# Patient Record
Sex: FEMALE | Race: ASIAN | NOT HISPANIC OR LATINO | Employment: FULL TIME | URBAN - METROPOLITAN AREA
[De-identification: names, ages, dates, MRNs, and addresses within clinical notes are randomized per-mention and may not be internally consistent; named-entity substitution may affect disease eponyms.]

---

## 2022-05-09 ENCOUNTER — HOSPITAL ENCOUNTER (EMERGENCY)
Facility: HOSPITAL | Age: 57
Discharge: HOME | End: 2022-05-09
Attending: EMERGENCY MEDICINE | Admitting: EMERGENCY MEDICINE
Payer: COMMERCIAL

## 2022-05-09 ENCOUNTER — APPOINTMENT (OUTPATIENT)
Dept: CT IMAGING | Facility: HOSPITAL | Age: 57
End: 2022-05-09
Attending: EMERGENCY MEDICINE
Payer: COMMERCIAL

## 2022-05-09 VITALS
HEIGHT: 59 IN | OXYGEN SATURATION: 98 % | HEART RATE: 88 BPM | TEMPERATURE: 98.4 F | WEIGHT: 155 LBS | DIASTOLIC BLOOD PRESSURE: 101 MMHG | RESPIRATION RATE: 15 BRPM | BODY MASS INDEX: 31.25 KG/M2 | SYSTOLIC BLOOD PRESSURE: 171 MMHG

## 2022-05-09 DIAGNOSIS — R19.7 VOMITING AND DIARRHEA: ICD-10-CM

## 2022-05-09 DIAGNOSIS — R11.10 VOMITING AND DIARRHEA: ICD-10-CM

## 2022-05-09 PROBLEM — J45.909 ASTHMA: Status: ACTIVE | Noted: 2022-05-09

## 2022-05-09 LAB
ALBUMIN SERPL-MCNC: 3.3 G/DL (ref 3.5–5)
ALP SERPL-CCNC: 107 U/L (ref 45–120)
ALT SERPL W P-5'-P-CCNC: 58 U/L (ref 0–45)
ANION GAP SERPL CALCULATED.3IONS-SCNC: 11 MMOL/L (ref 5–18)
AST SERPL W P-5'-P-CCNC: 35 U/L (ref 0–40)
BILIRUB DIRECT SERPL-MCNC: 0.1 MG/DL
BILIRUB SERPL-MCNC: 0.3 MG/DL (ref 0–1)
BUN SERPL-MCNC: 14 MG/DL (ref 8–22)
CALCIUM SERPL-MCNC: 8.9 MG/DL (ref 8.5–10.5)
CHLORIDE BLD-SCNC: 108 MMOL/L (ref 98–107)
CO2 SERPL-SCNC: 21 MMOL/L (ref 22–31)
CREAT SERPL-MCNC: 0.7 MG/DL (ref 0.6–1.1)
ERYTHROCYTE [DISTWIDTH] IN BLOOD BY AUTOMATED COUNT: 12.8 % (ref 10–15)
GFR SERPL CREATININE-BSD FRML MDRD: >90 ML/MIN/1.73M2
GLUCOSE BLD-MCNC: 157 MG/DL (ref 70–125)
HCT VFR BLD AUTO: 46.8 % (ref 35–47)
HGB BLD-MCNC: 15.1 G/DL (ref 11.7–15.7)
HOLD SPECIMEN: NORMAL
LIPASE SERPL-CCNC: 21 U/L (ref 0–52)
MAGNESIUM SERPL-MCNC: 1.8 MG/DL (ref 1.8–2.6)
MCH RBC QN AUTO: 28.6 PG (ref 26.5–33)
MCHC RBC AUTO-ENTMCNC: 32.3 G/DL (ref 31.5–36.5)
MCV RBC AUTO: 89 FL (ref 78–100)
PLATELET # BLD AUTO: 243 10E3/UL (ref 150–450)
POTASSIUM BLD-SCNC: 3.6 MMOL/L (ref 3.5–5)
PROT SERPL-MCNC: 6.9 G/DL (ref 6–8)
RBC # BLD AUTO: 5.28 10E6/UL (ref 3.8–5.2)
SODIUM SERPL-SCNC: 140 MMOL/L (ref 136–145)
WBC # BLD AUTO: 9.3 10E3/UL (ref 4–11)

## 2022-05-09 PROCEDURE — 85014 HEMATOCRIT: CPT | Performed by: EMERGENCY MEDICINE

## 2022-05-09 PROCEDURE — 83735 ASSAY OF MAGNESIUM: CPT | Performed by: EMERGENCY MEDICINE

## 2022-05-09 PROCEDURE — 258N000003 HC RX IP 258 OP 636: Performed by: EMERGENCY MEDICINE

## 2022-05-09 PROCEDURE — 250N000011 HC RX IP 250 OP 636: Performed by: EMERGENCY MEDICINE

## 2022-05-09 PROCEDURE — 83690 ASSAY OF LIPASE: CPT | Performed by: EMERGENCY MEDICINE

## 2022-05-09 PROCEDURE — 96374 THER/PROPH/DIAG INJ IV PUSH: CPT | Mod: 59

## 2022-05-09 PROCEDURE — 74177 CT ABD & PELVIS W/CONTRAST: CPT

## 2022-05-09 PROCEDURE — 82248 BILIRUBIN DIRECT: CPT | Performed by: EMERGENCY MEDICINE

## 2022-05-09 PROCEDURE — 99285 EMERGENCY DEPT VISIT HI MDM: CPT | Mod: 25

## 2022-05-09 PROCEDURE — 96361 HYDRATE IV INFUSION ADD-ON: CPT

## 2022-05-09 PROCEDURE — 36415 COLL VENOUS BLD VENIPUNCTURE: CPT | Performed by: EMERGENCY MEDICINE

## 2022-05-09 RX ORDER — ONDANSETRON 4 MG/1
4 TABLET, ORALLY DISINTEGRATING ORAL EVERY 8 HOURS PRN
Qty: 10 TABLET | Refills: 0 | Status: SHIPPED | OUTPATIENT
Start: 2022-05-09 | End: 2022-05-12

## 2022-05-09 RX ORDER — ONDANSETRON 2 MG/ML
4 INJECTION INTRAMUSCULAR; INTRAVENOUS ONCE
Status: COMPLETED | OUTPATIENT
Start: 2022-05-09 | End: 2022-05-09

## 2022-05-09 RX ORDER — IOPAMIDOL 755 MG/ML
100 INJECTION, SOLUTION INTRAVASCULAR ONCE
Status: COMPLETED | OUTPATIENT
Start: 2022-05-09 | End: 2022-05-09

## 2022-05-09 RX ORDER — LOPERAMIDE HYDROCHLORIDE 2 MG/1
2 TABLET ORAL 4 TIMES DAILY PRN
Qty: 10 TABLET | Refills: 0 | Status: SHIPPED | OUTPATIENT
Start: 2022-05-09

## 2022-05-09 RX ADMIN — SODIUM CHLORIDE 1000 ML: 9 INJECTION, SOLUTION INTRAVENOUS at 15:49

## 2022-05-09 RX ADMIN — IOPAMIDOL 100 ML: 755 INJECTION, SOLUTION INTRAVENOUS at 17:14

## 2022-05-09 RX ADMIN — ONDANSETRON 4 MG: 2 INJECTION INTRAMUSCULAR; INTRAVENOUS at 15:56

## 2022-05-09 NOTE — ED TRIAGE NOTES
Pt has had nausea, vomiting and diarrhea  Since Friday.  No abd ishaan. Diarrhea x2 today and no vomiting today.no one else in the house is ill.

## 2022-05-09 NOTE — DISCHARGE INSTRUCTIONS
Take the Zofran for your vomiting as discussed.  Do not start loperamide unless your diarrhea worsens.  Return for any significant worsening such as severe abdominal pain, bloody stools, fevers.

## 2022-05-09 NOTE — ED NOTES
"    ED Provider In Triage Note  [facility]  Encounter Date: May 9, 2022    Chief Complaint   Patient presents with     Vomiting     Diarrhea         Use of Intrepreter: N/A      Brief HPI:   Chicho Jin is a 57 year old female presenting to the Emergency Department with a chief complaint of nausea and diarrhea since Friday.    Slowed down over the weekend and then worse again today. 2 episodes of diarrhea today, no vomiting today.      Brief Physical Exam:  BP (!) 151/84   Pulse 114   Temp 98.4  F (36.9  C) (Tympanic)   Resp 12   Ht 1.499 m (4' 11\")   Wt 70.3 kg (155 lb)   SpO2 94%   BMI 31.31 kg/m    General: Non-toxic appearing  HEENT: Atraumatic  Resp: No respiratory distress  Abdomen: Non-peritoneal  Neuro: Alert, oriented, answers questions appropriately  Psych: Behavior appropriate  Musculoskeletal: atraumatic      Plan Initiated in Triage:  Labs and CT scan        PIT Dispo:   Return to lobby while awaiting workup and ED bed availability          Tiana Corona MD on 5/9/2022 at 1:18 PM        Patient was evaluated by the Physician in Triage due to a limitation of available rooms in the Emergency Department. A plan of care was discussed based on the information obtained on the initial evaluation and patient was counseled to return back to the Emergency Department lobby after this initial evalutaiton until results were obtained or a room became available in the Emergency Department. Patient was counseled not to leave prior to receiving the results of their workup.            Tiana Corona MD  05/09/22 1324    "

## 2022-05-09 NOTE — ED PROVIDER NOTES
EMERGENCY DEPARTMENT ENCOUNTER      NAME: Chicho ONTIVEROS Her  AGE: 57 year old female  YOB: 1965  MRN: [id number]  EVALUATION DATE & TIME: No admission date for patient encounter.    PCP: Swetha Mancilla    ED PROVIDER: José Miguel Sosa M.D.      Chief Complaint   Patient presents with     Vomiting     Diarrhea       FINAL IMPRESSION:  1. Vomiting and diarrhea        ED COURSE & MEDICAL DECISION MAKING:    Pertinent Labs & Imaging studies reviewed. (See chart for details)  57 year old female presents to the Emergency Department for evaluation of vomiting and diarrhea.  Symptoms been ongoing for last 36 hours.  Diarrhea is actually improving.  Minimal vomiting.  Patient with episodes of similar symptoms.  No obvious unusual exposures or ingestions.  Laboratory evaluation obtained from triage and Zofran given with good relief.  Laboratory evaluation reassuring.  Exam is benign.  Patient likely with foodborne versus viral process.  Will continue outpatient management with Zofran and loperamide as needed.. Patient appears non toxic with stable vitals signs. Overall exam is benign.      5:58 PM I met with the patient for the initial interview and physical examination. Discussed plan for treatment and workup in the ED. Updated on results and discussed plan for discharge - patient agreeable.    At the conclusion of the encounter I discussed the results of all of the tests and the disposition. The questions were answered and return precautions provided. The patient or family acknowledged understanding and was agreeable with the care plan.       PPE: Provider wore gloves, N95 mask, eye protection, surgical cap.     MEDICATIONS GIVEN IN THE EMERGENCY:  Medications   ondansetron (ZOFRAN) injection 4 mg (4 mg Intravenous Given 5/9/22 1556)   0.9% sodium chloride BOLUS (0 mLs Intravenous Stopped 5/9/22 1656)   iopamidol (ISOVUE-370) solution 100 mL (100 mLs Intravenous Given 5/9/22 1714)       NEW PRESCRIPTIONS STARTED AT TODAY'S  ER VISIT  Discharge Medication List as of 5/9/2022  6:18 PM      START taking these medications    Details   loperamide (IMODIUM A-D) 2 MG tablet Take 1 tablet (2 mg) by mouth 4 times daily as needed for diarrhea, Disp-10 tablet, R-0, Local Print      !! ondansetron (ZOFRAN ODT) 4 MG ODT tab Take 1 tablet (4 mg) by mouth every 8 hours as needed for nausea, Disp-10 tablet, R-0, Local Print       !! - Potential duplicate medications found. Please discuss with provider.        =================================================================    HPI    Patient information was obtained from: Patient    Use of Intrepreter: N/A      Zoua X Her is a 57 year old female with a pertient medical history of asthma, fibromyalgia, who presents to the ED for evaluation of vomiting and diarrhea. Patient reports persistent nausea, vomiting, and diarrhea since Friday. Reports 2 episodes of diarrhea today, no vomiting today. Feels that the frequency of her diarrhea is slowing. She denies any abdominal pain. Denies any recent diet changes. She denies any recent travel besides going to [place] on Friday. Patient denies any sick contacts. No recent antibiotics. Denies any other concerns.      REVIEW OF SYSTEMS   Constitutional:  Denies fever, chills  Respiratory:  Denies productive cough or increased work of breathing  Cardiovascular:  Denies chest pain, palpitations  GI:  Denies abdominal pain or change in bladder habits. Positive for nausea, vomiting, diarrhea.  Musculoskeletal:  Denies any new muscle/joint swelling  Skin:  Denies rash   Neurologic:  Denies focal weakness  All systems negative except as marked.     PAST MEDICAL HISTORY:  History reviewed. No pertinent past medical history.    PAST SURGICAL HISTORY:  History reviewed. No pertinent surgical history.      CURRENT MEDICATIONS:    No current facility-administered medications for this encounter.    Current Outpatient Medications:      loperamide (IMODIUM A-D) 2 MG tablet,  "Take 1 tablet (2 mg) by mouth 4 times daily as needed for diarrhea, Disp: 10 tablet, Rfl: 0     ondansetron (ZOFRAN ODT) 4 MG ODT tab, Take 1 tablet (4 mg) by mouth every 8 hours as needed for nausea, Disp: 10 tablet, Rfl: 0     loperamide (IMODIUM) 2 mg capsule, [LOPERAMIDE (IMODIUM) 2 MG CAPSULE] Take 1 capsule (2 mg total) by mouth 4 (four) times a day as needed for diarrhea., Disp: 12 capsule, Rfl: 0     ondansetron (ZOFRAN-ODT) 4 MG disintegrating tablet, [ONDANSETRON (ZOFRAN-ODT) 4 MG DISINTEGRATING TABLET] Take 1 tablet (4 mg total) by mouth every 8 (eight) hours as needed., Disp: 20 tablet, Rfl: 0    ALLERGIES:  No Known Allergies    FAMILY HISTORY:  Family History   Problem Relation Age of Onset     No Known Problems Mother      No Known Problems Father      No Known Problems Sister      No Known Problems Daughter      No Known Problems Maternal Grandmother      No Known Problems Maternal Grandfather      No Known Problems Paternal Grandmother      No Known Problems Paternal Grandfather      No Known Problems Maternal Aunt      No Known Problems Paternal Aunt      Hereditary Breast and Ovarian Cancer Syndrome No family hx of      Breast Cancer No family hx of      Cancer No family hx of      Colon Cancer No family hx of      Endometrial Cancer No family hx of      Ovarian Cancer No family hx of        SOCIAL HISTORY:   Social History     Socioeconomic History     Marital status:        VITALS:  Patient Vitals for the past 24 hrs:   BP Temp Temp src Pulse Resp SpO2 Height Weight   05/09/22 1826 (!) 171/101 -- -- 88 15 98 % -- --   05/09/22 1321 (!) 151/84 98.4  F (36.9  C) Tympanic 114 12 94 % 1.499 m (4' 11\") 70.3 kg (155 lb)        PHYSICAL EXAM    Constitutional:  Awake, alert, in no apparent distress  HENT:  Normocephalic, Atraumatic. Bilateral external ears normal. Oropharynx moist. Nose normal. Neck- Normal range of motion with no guarding, No midline cervical tenderness, Supple, No stridor. "   Eyes:  PERRL, EOMI with no signs of entrapment, Conjunctiva normal, No discharge.   Respiratory:  Normal breath sounds, No respiratory distress, No wheezing.    Cardiovascular:  Normal heart rate, Normal rhythm, No appreciable rubs or gallops.   GI:  Soft, No tenderness, No distension, No palpable masses  Musculoskeletal:  Intact distal pulses, No edema. Good range of motion in all major joints. No tenderness to palpation or major deformities noted.  Integument:  Warm, Dry, No erythema, No rash.   Neurologic:  Alert & oriented, Normal motor function, Normal sensory function, No focal deficits noted.   Psychiatric:  Affect normal, Judgment normal, Mood normal.     LAB:  All pertinent labs reviewed and interpreted.  Results for orders placed or performed during the hospital encounter of 05/09/22   CT Abdomen Pelvis w Contrast    Impression    IMPRESSION:   1.  No acute abnormality in the abdomen or pelvis.  2.  Hepatic steatosis.   CBC (+ platelets, no diff)   Result Value Ref Range    WBC Count 9.3 4.0 - 11.0 10e3/uL    RBC Count 5.28 (H) 3.80 - 5.20 10e6/uL    Hemoglobin 15.1 11.7 - 15.7 g/dL    Hematocrit 46.8 35.0 - 47.0 %    MCV 89 78 - 100 fL    MCH 28.6 26.5 - 33.0 pg    MCHC 32.3 31.5 - 36.5 g/dL    RDW 12.8 10.0 - 15.0 %    Platelet Count 243 150 - 450 10e3/uL   Basic metabolic panel   Result Value Ref Range    Sodium 140 136 - 145 mmol/L    Potassium 3.6 3.5 - 5.0 mmol/L    Chloride 108 (H) 98 - 107 mmol/L    Carbon Dioxide (CO2) 21 (L) 22 - 31 mmol/L    Anion Gap 11 5 - 18 mmol/L    Urea Nitrogen 14 8 - 22 mg/dL    Creatinine 0.70 0.60 - 1.10 mg/dL    Calcium 8.9 8.5 - 10.5 mg/dL    Glucose 157 (H) 70 - 125 mg/dL    GFR Estimate >90 >60 mL/min/1.73m2   Hepatic function panel   Result Value Ref Range    Bilirubin Total 0.3 0.0 - 1.0 mg/dL    Bilirubin Direct 0.1 <=0.5 mg/dL    Protein Total 6.9 6.0 - 8.0 g/dL    Albumin 3.3 (L) 3.5 - 5.0 g/dL    Alkaline Phosphatase 107 45 - 120 U/L    AST 35 0 - 40 U/L     ALT 58 (H) 0 - 45 U/L   Result Value Ref Range    Lipase 21 0 - 52 U/L   Result Value Ref Range    Magnesium 1.8 1.8 - 2.6 mg/dL   Extra Red Top Tube   Result Value Ref Range    Hold Specimen JIC    Extra Green Top (Lithium Heparin) Tube   Result Value Ref Range    Hold Specimen JIC    Extra Purple Top Tube   Result Value Ref Range    Hold Specimen JIC        RADIOLOGY:  Reviewed all pertinent imaging. Please see official radiology report.  CT Abdomen Pelvis w Contrast   Final Result   IMPRESSION:    1.  No acute abnormality in the abdomen or pelvis.   2.  Hepatic steatosis.          I, Adeel Gongora, am serving as a scribe to document services personally performed by José Miguel Sosa MD, based on my observation and the provider's statements to me. I, José Miguel Sosa MD attest that Adeel Gongora is acting in a scribe capacity, has observed my performance of the services and has documented them in accordance with my direction.    José Miguel Sosa M.D.  Emergency Medicine  [facility] EMERGENCY DEPARTMENT     José Miguel Sosa MD  05/11/22 0608

## 2023-09-13 ENCOUNTER — APPOINTMENT (OUTPATIENT)
Dept: RADIOLOGY | Facility: HOSPITAL | Age: 58
End: 2023-09-13
Attending: EMERGENCY MEDICINE
Payer: COMMERCIAL

## 2023-09-13 ENCOUNTER — HOSPITAL ENCOUNTER (EMERGENCY)
Facility: HOSPITAL | Age: 58
Discharge: HOME | End: 2023-09-13
Attending: EMERGENCY MEDICINE | Admitting: EMERGENCY MEDICINE
Payer: COMMERCIAL

## 2023-09-13 VITALS
HEART RATE: 73 BPM | OXYGEN SATURATION: 98 % | WEIGHT: 167.6 LBS | DIASTOLIC BLOOD PRESSURE: 86 MMHG | HEIGHT: 59 IN | SYSTOLIC BLOOD PRESSURE: 141 MMHG | RESPIRATION RATE: 16 BRPM | TEMPERATURE: 97.8 F | BODY MASS INDEX: 33.79 KG/M2

## 2023-09-13 DIAGNOSIS — S80.211A ABRASION OF RIGHT KNEE, INITIAL ENCOUNTER: ICD-10-CM

## 2023-09-13 DIAGNOSIS — M79.662 PAIN IN LEFT SHIN: ICD-10-CM
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
## 2023-09-13 DIAGNOSIS — S80.12XA TRAUMATIC ECCHYMOSIS OF LEFT LOWER LEG, INITIAL ENCOUNTER: ICD-10-CM

## 2023-09-13 LAB
ANION GAP SERPL CALCULATED.3IONS-SCNC: 12 MMOL/L (ref 7–15)
BASOPHILS # BLD AUTO: 0 10E3/UL (ref 0–0.2)
BASOPHILS NFR BLD AUTO: 0 %
BUN SERPL-MCNC: 13.5 MG/DL (ref 6–20)
CALCIUM SERPL-MCNC: 8.8 MG/DL (ref 8.6–10)
CHLORIDE SERPL-SCNC: 108 MMOL/L (ref 98–107)
CREAT SERPL-MCNC: 0.67 MG/DL (ref 0.51–0.95)
DEPRECATED HCO3 PLAS-SCNC: 23 MMOL/L (ref 22–29)
EGFRCR SERPLBLD CKD-EPI 2021: >90 ML/MIN/1.73M2
EOSINOPHIL # BLD AUTO: 0.1 10E3/UL (ref 0–0.7)
EOSINOPHIL NFR BLD AUTO: 2 %
ERYTHROCYTE [DISTWIDTH] IN BLOOD BY AUTOMATED COUNT: 13.8 % (ref 10–15)
GLUCOSE SERPL-MCNC: 111 MG/DL (ref 70–99)
HCT VFR BLD AUTO: 41.4 % (ref 35–47)
HGB BLD-MCNC: 13 G/DL (ref 11.7–15.7)
IMM GRANULOCYTES # BLD: 0 10E3/UL
IMM GRANULOCYTES NFR BLD: 0 %
INR PPP: 0.94 (ref 0.85–1.15)
LYMPHOCYTES # BLD AUTO: 1.1 10E3/UL (ref 0.8–5.3)
LYMPHOCYTES NFR BLD AUTO: 12 %
MCH RBC QN AUTO: 28 PG (ref 26.5–33)
MCHC RBC AUTO-ENTMCNC: 31.4 G/DL (ref 31.5–36.5)
MCV RBC AUTO: 89 FL (ref 78–100)
MONOCYTES # BLD AUTO: 0.7 10E3/UL (ref 0–1.3)
MONOCYTES NFR BLD AUTO: 8 %
NEUTROPHILS # BLD AUTO: 6.9 10E3/UL (ref 1.6–8.3)
NEUTROPHILS NFR BLD AUTO: 78 %
NRBC # BLD AUTO: 0 10E3/UL
NRBC BLD AUTO-RTO: 0 /100
PLATELET # BLD AUTO: 190 10E3/UL (ref 150–450)
POTASSIUM SERPL-SCNC: 4 MMOL/L (ref 3.4–5.3)
RBC # BLD AUTO: 4.64 10E6/UL (ref 3.8–5.2)
SODIUM SERPL-SCNC: 143 MMOL/L (ref 136–145)
WBC # BLD AUTO: 8.9 10E3/UL (ref 4–11)

## 2023-09-13 PROCEDURE — 85610 PROTHROMBIN TIME: CPT | Performed by: EMERGENCY MEDICINE

## 2023-09-13 PROCEDURE — 36415 COLL VENOUS BLD VENIPUNCTURE: CPT | Performed by: STUDENT IN AN ORGANIZED HEALTH CARE EDUCATION/TRAINING PROGRAM

## 2023-09-13 PROCEDURE — 99284 EMERGENCY DEPT VISIT MOD MDM: CPT

## 2023-09-13 PROCEDURE — 80048 BASIC METABOLIC PNL TOTAL CA: CPT | Performed by: EMERGENCY MEDICINE

## 2023-09-13 PROCEDURE — 85025 COMPLETE CBC W/AUTO DIFF WBC: CPT | Performed by: EMERGENCY MEDICINE

## 2023-09-13 PROCEDURE — 73590 X-RAY EXAM OF LOWER LEG: CPT | Mod: LT

## 2023-09-13 PROCEDURE — 36415 COLL VENOUS BLD VENIPUNCTURE: CPT | Performed by: EMERGENCY MEDICINE

## 2023-09-13 ASSESSMENT — ACTIVITIES OF DAILY LIVING (ADL): ADLS_ACUITY_SCORE: 35

## 2023-09-13 NOTE — ED NOTES
Patient and family member reviewed discharge.  Discussed printed discharge information.  Follow-up appts reviewed.   What s/s warrant return to the er.  Importance of social distancing, good hand hygiene, and proper primary care follow-up to maintain health.

## 2023-09-13 NOTE — Clinical Note
Chicho Her was seen and treated in our emergency department on 9/13/2023.  She may return to work on 09/18/2023.       If you have any questions or concerns, please don't hesitate to call.      Chloe Hernandez MD

## 2023-09-13 NOTE — ED TRIAGE NOTES
Patient was fishing on Saturday and fell on some rocks striking her left shin. Today, the pain is annoying and their is bruising on the lower part of her leg. No thinners. Pedal pulse WDL.   Here with daughter      Triage Assessment       Row Name 09/13/23 1249       Triage Assessment (Adult)    Airway WDL WDL       Respiratory WDL    Respiratory WDL WDL       Skin Circulation/Temperature WDL    Skin Circulation/Temperature WDL X  brusied on lower left calf       Cardiac WDL    Cardiac WDL WDL       Peripheral/Neurovascular WDL    Peripheral Neurovascular WDL WDL       Cognitive/Neuro/Behavioral WDL    Cognitive/Neuro/Behavioral WDL WDL

## 2023-09-13 NOTE — ED PROVIDER NOTES
EMERGENCY DEPARTMENT ENCOUNTER      NAME: Chicho ONTIVEROS Her  YOB: 1965  MRN: [id number]    FINAL IMPRESSION  1. Traumatic ecchymosis of left lower leg, initial encounter    2. Pain in left shin    3. Fall, initial encounter    4. Abrasion of right knee, initial encounter        MEDICAL DECISION MAKING   Pertinent Labs & Imaging studies reviewed. (See chart for details)    Chicho Jin is a 58 year old female who presents for evaluation of left leg bruising after a fall.  Patient was fishing with her significant other 4 days ago and reports that she fell and hit her left shin and right knee on some rocks.  There was no head trauma or other injuries and she was able to get up on her own.  Over the last few days, she had developed increasing ecchymosis to her right shin.  When daughter came over, she noticed the area of swelling and states that it seemed out of proportion to the injury so decided to bring patient in for evaluation.  Patient has been able to ambulate, although states that doing so does increase the discomfort in the area.  She did not sustain any other injuries.  In addition, patient and daughter expressed concern about how easy the patient seems to bruise with even minor injuries such as a blood pressure cuff around her arm, IV placement and what seemed to be very minor bumps.  Daughter notes that she has had extensive evaluation by multiple hematologist but etiology has yet to be determined.  Patient is not on a blood thinner.  Remainder of history and exam, as below.     Records reviewed.  Patient was seen by hematology on 3/22/2023 for evaluation of easy bruising that have been ongoing for about 4 to 5 years.  At that time, there did not appear to be any evidence of a bleeding disorder plan was for consideration of platelet function assay for further evaluation but at that time patient elected to hold off, as her symptoms were not particularly bothersome.  She was advised to follow-up if her  symptoms started to cause more problems.    Basic labs were ordered while patient was awaiting evaluation in triage.  CBC showed no acute anemia and normal platelets.  BMP without electrolyte derangement, acidosis, or acute kidney injury.  I reviewed these results with the patient and her daughter during my initial encounter.  Given her report of easy bruising, will plan to add on an INR today but I suspect she will likely need further and more extensive evaluation by hematology team going forward.  With regards to discomfort and bruising of the left shin, we agreed on plan to obtain a plain film to rule out underlying fracture or other bony injury.  I did also consider DVT but feel this less likely given history and exam.  Patient declined offer for analgesic.    I independently reviewed x-ray and this showed no evidence of acute bony injury.  I updated patient and her daughter with these results and they felt reassured.  We have agreed on conservative management of symptoms for now with rest, ice, elevation, and Tylenol recommended close follow-up with primary care provider.  I did offer to send with contact information for hematology team but patient states that she has this from previous visits and will also follow-up with her primary doctor which I believe is very reasonable.  Today, she has been comfortable without any interventions and is able to ambulate without assistance.    Strict return precautions and follow up recommendations were discussed and all questions were answered. Patient and daughter endorsed understanding and was in agreement with plan.    I independently reviewed x-ray (as noted above), formal radiologist read pending.      Medical Decision Making    History:  Supplemental history from: Family Member/Significant Other  External Record(s) reviewed: Outpatient Record: Hematology visit on 3/22/2023    Work Up:  Chart documentation includes differential considered and any EKGs or imaging  independently interpreted by provider, where specified.  In additional to work up documented, I considered the following work up: Documented in chart, if applicable.    External consultation:  Discussion of management with another provider: Documented in chart, if applicable    Complicating factors:  Care impacted by chronic illness: Diabetes  Care affected by social determinants of health: Access to Medical Care    Disposition considerations: Discharge. No recommendations on prescription strength medication(s). See documentation for any additional details.      ED COURSE  5:36 PM I met with the patient, obtained history, performed an initial exam, and discussed options and plan for diagnostics and treatment here in the ED.  6:15 PM Rechecked and reevaluated patient. Discussed plan for discharge - patient agreeable.      MEDICATIONS GIVEN IN THE ED  Medications - No data to display    NEW PRESCRIPTIONS STARTED AT TODAY'S VISIT  Discharge Medication List as of 9/13/2023  6:28 PM        =================================================================    Chief Complaint   Patient presents with    Fall    Leg Pain       HPI:    Patient information was obtained from: Patient, daughter    Use of : Yes (In Person -Daughter) - Language Hmong    Zoua X Her is a 58 year old female who presents for evaluation of leg pain and bruising. Patient reports that she was fishing with her  on Saturday 9/9/23 and fell and hit both of her legs on some rocks. She scraped her right knee and hit her shin on the rock. Since then has developed some bruising and only has pain with walking, but she is able to walk. Her only discomfort is in her shin and ankle. Endorses mild swelling, but no numbness or tingling. Denies hitting her head or LOC. No other injuries.    Patient states that she decided to come in today because the bruising was really bad. Daughter reports that the patient bruises really easily and has been seen by  "hematology, but they are unsure why she is bruising easily. She is not anticoagulated.    She denies any other complaints or concerns.    RELEVANT HISTORY, MEDICATIONS, & ALLERGIES   Past medical history, surgical history, family history, medications, and allergies reviewed and pertinent noted in HPI.    REVIEW OF SYSTEMS:  A complete review of systems was performed with pertinent positives and negatives noted in the HPI. All other systems negative.     PHYSICAL EXAM:    Vitals: BP (!) 141/86   Pulse 73   Temp 97.8  F (36.6  C)   Resp 16   Ht 1.499 m (4' 11\")   Wt 76 kg (167 lb 9.6 oz)   SpO2 98%   BMI 33.85 kg/m     General: Alert and interactive, comfortable appearing.  HENT: Atraumatic. Full AROM of neck. Conjunctiva clear.   Cardiovascular: Regular rate and rhythm.   Chest/Pulmonary: Normal work of breathing. Speaking in complete sentences. Lungs CTAB.   Extremities: Normal AROM of all major joints of bilateral upper extremities and lower extremities.  Left lower extremity: Diffuse ecchymosis over anterior shin, most prominent over mid/distal level.  Mild associated tenderness to palpation.  No open wounds.  Mild edema of ankle.  Normal range of motion of knee and ankle.  Sensation intact all distributions.  Palpable DP and PT pulses.  See photo below.  Right lower extremity: Superficial abrasion over knee with mild surrounding ecchymosis. ated tenderness to palpation.  No open wounds.  Mild edema of ankle.  Normal range of motion of knee and ankle.  Sensation intact all distributions.  Palpable DP and PT pulses.  Skin: Warm and dry.  Scattered areas of ecchymosis over upper extremities.  Neuro: Speech clear. CNs grossly intact. Moves all extremities spontaneously.  Ambulatory.  Psych: Normal affect/mood, cooperative, memory appropriate.      LAB  Labs Ordered and Resulted from Time of ED Arrival to Time of ED Departure   BASIC METABOLIC PANEL - Abnormal       Result Value    Sodium 143      Potassium 4.0 "      Chloride 108 (*)     Carbon Dioxide (CO2) 23      Anion Gap 12      Urea Nitrogen 13.5      Creatinine 0.67      Calcium 8.8      Glucose 111 (*)     GFR Estimate >90     CBC WITH PLATELETS AND DIFFERENTIAL - Abnormal    WBC Count 8.9      RBC Count 4.64      Hemoglobin 13.0      Hematocrit 41.4      MCV 89      MCH 28.0      MCHC 31.4 (*)     RDW 13.8      Platelet Count 190      % Neutrophils 78      % Lymphocytes 12      % Monocytes 8      % Eosinophils 2      % Basophils 0      % Immature Granulocytes 0      NRBCs per 100 WBC 0      Absolute Neutrophils 6.9      Absolute Lymphocytes 1.1      Absolute Monocytes 0.7      Absolute Eosinophils 0.1      Absolute Basophils 0.0      Absolute Immature Granulocytes 0.0      Absolute NRBCs 0.0         RADIOLOGY  XR Tibia and Fibula Left 2 Views   Final Result   IMPRESSION: The left tibia and fibula are negative for fracture. There is soft tissue swelling about the ankle but no disruption of ankle mortise.          I, Adeel Gongora, am serving as a scribe to document services personally performed by Dr. Chloe Hernandez based on my observation and the provider's statements to me. I, Chloe Hernandez MD attest that Adeel Gongora is acting in a scribe capacity, has observed my performance of the services and has documented them in accordance with my direction.    Chloe Hernandez M.D.  Emergency Medicine  [facility] EMERGENCY DEPARTMENT  [facility] [address]  [phone]  Dept: [phone]     Chloe Hernandez MD  09/15/23 0622

## 2023-09-13 NOTE — DISCHARGE INSTRUCTIONS
You were seen in the emergency department today for leg bruising and pain. Your imaging showed no broken bones.     To help with pain:  - Ice the injury for 20 minutes, 3-5 times per day (or up to every 2 hours as needed) for the first 24-72 hours. You can either use an ice pack or plastic bag filled with ice, cover either one with a damp cloth to prevent the cold from burning your skin.   - Elevate your leg when you don't need to be up on your feet   - You may take 650-1000mg of Acetaminophen (Tylenol).  Please do not use more than 3000 mg in a 24 hour period. Tylenol is an effective drug when taken at the prescribed dosages but can cause bodily injury including liver damage if taken too often or at too high of dose.    Please return to the Emergency Department if you have uncontrolled/worsening pain, difficulty breathing, numbness, inability to keep food/fluids down, or any other new or concerning symptoms. Otherwise please follow up with your primary doctor and the hematology clinic for recheck.    Included is some information that you might find informative and useful.    Thank you for choosing [facility]. It was a pleasure taking care of you today!  - Dr. Chloe Hernandez

## 2024-03-03 ENCOUNTER — HEALTH MAINTENANCE LETTER (OUTPATIENT)
Age: 59
End: 2024-03-03

## 2024-06-02 ENCOUNTER — HOSPITAL ENCOUNTER (EMERGENCY)
Facility: HOSPITAL | Age: 59
Discharge: HOME | End: 2024-06-02
Attending: EMERGENCY MEDICINE | Admitting: EMERGENCY MEDICINE
Payer: COMMERCIAL

## 2024-06-02 VITALS
OXYGEN SATURATION: 96 % | SYSTOLIC BLOOD PRESSURE: 134 MMHG | TEMPERATURE: 98.7 F | BODY MASS INDEX: 32.88 KG/M2 | HEART RATE: 84 BPM | DIASTOLIC BLOOD PRESSURE: 82 MMHG | HEIGHT: 59 IN | WEIGHT: 163.1 LBS | RESPIRATION RATE: 18 BRPM

## 2024-06-02 DIAGNOSIS — R51.9 ACUTE NONINTRACTABLE HEADACHE, UNSPECIFIED HEADACHE TYPE: ICD-10-CM

## 2024-06-02 DIAGNOSIS — L03.116 CELLULITIS OF LEFT LOWER EXTREMITY: ICD-10-CM

## 2024-06-02 PROCEDURE — 99284 EMERGENCY DEPT VISIT MOD MDM: CPT

## 2024-06-02 PROCEDURE — 250N000013 HC RX MED GY IP 250 OP 250 PS 637

## 2024-06-02 RX ORDER — CLINDAMYCIN HCL 150 MG
300 CAPSULE ORAL ONCE
Status: DISCONTINUED | OUTPATIENT
Start: 2024-06-02 | End: 2024-06-02

## 2024-06-02 RX ORDER — CEPHALEXIN 500 MG/1
500 CAPSULE ORAL ONCE
Status: COMPLETED | OUTPATIENT
Start: 2024-06-02 | End: 2024-06-02

## 2024-06-02 RX ORDER — ACETAMINOPHEN 325 MG/1
650 TABLET ORAL ONCE
Status: COMPLETED | OUTPATIENT
Start: 2024-06-02 | End: 2024-06-02

## 2024-06-02 RX ORDER — SULFAMETHOXAZOLE/TRIMETHOPRIM 800-160 MG
1 TABLET ORAL ONCE
Status: COMPLETED | OUTPATIENT
Start: 2024-06-02 | End: 2024-06-02

## 2024-06-02 RX ORDER — SULFAMETHOXAZOLE/TRIMETHOPRIM 800-160 MG
1 TABLET ORAL 2 TIMES DAILY
Qty: 20 TABLET | Refills: 0 | Status: SHIPPED | OUTPATIENT
Start: 2024-06-02 | End: 2024-06-12

## 2024-06-02 RX ORDER — CEPHALEXIN 500 MG/1
500 CAPSULE ORAL 4 TIMES DAILY
Qty: 40 CAPSULE | Refills: 0 | Status: SHIPPED | OUTPATIENT
Start: 2024-06-02 | End: 2024-06-12

## 2024-06-02 RX ADMIN — CEPHALEXIN 500 MG: 500 CAPSULE ORAL at 22:35

## 2024-06-02 RX ADMIN — SULFAMETHOXAZOLE AND TRIMETHOPRIM 1 TABLET: 800; 160 TABLET ORAL at 22:35

## 2024-06-02 RX ADMIN — ACETAMINOPHEN 650 MG: 325 TABLET, FILM COATED ORAL at 21:49

## 2024-06-02 ASSESSMENT — ACTIVITIES OF DAILY LIVING (ADL)
ADLS_ACUITY_SCORE: 35

## 2024-06-02 ASSESSMENT — COLUMBIA-SUICIDE SEVERITY RATING SCALE - C-SSRS
1. IN THE PAST MONTH, HAVE YOU WISHED YOU WERE DEAD OR WISHED YOU COULD GO TO SLEEP AND NOT WAKE UP?: NO
6. HAVE YOU EVER DONE ANYTHING, STARTED TO DO ANYTHING, OR PREPARED TO DO ANYTHING TO END YOUR LIFE?: NO
2. HAVE YOU ACTUALLY HAD ANY THOUGHTS OF KILLING YOURSELF IN THE PAST MONTH?: NO

## 2024-06-02 NOTE — Clinical Note
Chicho Her was seen and treated in our emergency department on 6/2/2024.  She may return to work on 06/04/2024.       If you have any questions or concerns, please don't hesitate to call.      Maribel Casey PA-C

## 2024-06-02 NOTE — Clinical Note
Chicho Her was seen and treated in our emergency department on 6/2/2024.  She may return to work on 06/04/2024.  Chicho Her should be allowed to keep her leg elevated and rest and not stand on her feet all day at work throughout the workday.     If you have any questions or concerns, please don't hesitate to call.      Maribel Casey PA-C

## 2024-06-03 NOTE — ED PROVIDER NOTES
EMERGENCY DEPARTMENT ENCOUNTER      NAME: Chicho ONTIVEROS Her  AGE: 59 year old female  YOB: 1965  MRN: [id number]  EVALUATION DATE & TIME: 06/02/24 9:38 PM    PCP: Swetha Mancilla    ED PROVIDER: Maribel Casey PA-C      CHIEF COMPLAINT:  Wound Check      FINAL IMPRESSION:  1. Cellulitis of left lower extremity    2. Acute nonintractable headache, unspecified headache type          ED COURSE & MEDICAL DECISION MAKING:  Pertinent Labs & Imaging studies reviewed. (See chart for details)    The patient is a 59 year old-year-old female with a history of type 2 diabetes mellitus presenting to the emergency department for evaluation of wound check. She went fishing 3 days ago and while running to bring her  a fishing net, she tripped on sand and [place] with resulting abrasions to her left lower extremity. No head impact with the fall or loss of consciousness. No water got into the wounds. Since then she had progressive swelling, redness, and chills. She is able to walk on it. She also endorses a headache.    Initial vital signs reviewed and significant for borderline tachycardia with  bpm and elevated blood pressure. Repeat vital signs with heart rate in the 80s and improved blood pressure. She is afebrile. On exam, the patient is non toxic appearing resting comfortably in hospital bed in no acute distress.     Neuro exam with no focal deficitsso have low suspicion for intracranial process such as CVA, SAH, SDH, do feel headache is most consistent with migraine, tension headache, cluster headache and do not feel that imaging of her head is indicated at this time. No head impact or loss of consciousness with her fall to suggest traumatic epidural/subdural hematoma. There is no fever to suggest meningitis or encephalitis. The patient also denies vision change or ocular pain and has no exam findings to suggest acute angle-closure glaucoma. There is no temporal tenderness, vision  change to suggest temporal arteritis and no concerning findings on history or exam to suggest tumor/mass.  Given history, reassuring exam, and in the absence of s/s suggestive of concerning intracranial pathology, I do feel that etiology is most likely primary/benign headache. I see no indications for lab/imaging workup on emergent basis and patient agrees. Patient was treated in the emergency department with acetaminophen with improved headache on re-evaluation.    Left lower extremity with two abrasions to anterior shin with surrounding erythema and increased warmth. No purulent drainage. No areas of fluctuance to suggest abscess. No crepitus or pain out of proportion to suggest necrotizing fascitis. No bony tenderness to palpation to suggest fracture or dislocation, low clinical suspicion for these especially as the patient has been weightbearing and able to be active in her garden since the fall. Distal CMS intact, compartments soft, low clinical suspicion for compartment syndrome. Considered DVT but do feel history and exam most consistent with left lower extremity cellulitis. She is afebrile, vitally stable, and non toxic appearing, low clinical suspicion for sepsis at this time and do not feel that labs are indicated at this time.    Given history of diabetes mellitus, will treat with cephalexin and Bactrim with first doses given in the emergency department. Discussed plan for discharge to home with prescriptions for Keflex and Bactrim with close outpatient follow up with her primary care clinician within the next 48-72 hours for close recheck. The patient verbalized understanding and is comfortable with this plan. Symptomatic home cares discussed. Emphasized importance of follow up with primary care clinician. Strict return precautions discussed.     At the conclusion of the encounter I discussed the results of all of the tests and the disposition. The questions were answered. The patient or family  acknowledged understanding and was agreeable with the care plan.       ED COURSE:  9:25 PM I met and introduced myself to the patient. I gathered initial history and performed an initial physical exam. We discussed options and plan for diagnostics and treatment here in the ED.  9:40 PM I have staffed the patient with Dr. Sosa, ED physician, who has evaluated the patient and agrees with all aspects of today's care.   11:03 PM I discussed the plan for discharge with the patient, and patient is agreeable. We discussed supportive cares at home and reasons for return to the ER including new or worsening symptoms - all questions and concerns addressed to the best of my ability. Strict return precautions discussed. Patient to be discharged by RN.      Medical Decision Making  Obtained supplemental history:Supplemental history obtained?: No  Reviewed external records: External records reviewed?: Outpatient Record: [facility] ED visit 9/13/2023  Care impacted by chronic illness:Diabetes and Other: Asthma  Care significantly affected by social determinants of health:N/A  Did you consider but not order tests?: Work up considered but not performed and documented in chart, if applicable  Did you interpret images independently?: Independent interpretation of ECG and images noted in documentation, when applicable.  Consultation discussion with other provider:Did you involve another provider (consultant, MH, pharmacy, etc.)?: No  Discharge. I prescribed additional prescription strength medication(s) as charted. See documentation for any additional details.      CRITICAL CARE:  Not applicable      MEDICATIONS GIVEN IN THE EMERGENCY:  Medications   acetaminophen (TYLENOL) tablet 650 mg (650 mg Oral $Given 6/2/24 2149)   sulfamethoxazole-trimethoprim (BACTRIM DS) 800-160 MG per tablet 1 tablet (1 tablet Oral $Given 6/2/24 2235)   cephALEXin (KEFLEX) capsule 500 mg (500 mg Oral $Given 6/2/24 2235)       NEW PRESCRIPTIONS STARTED AT  "TODAY'S ER VISIT  Discharge Medication List as of 6/2/2024 11:13 PM        START taking these medications    Details   cephALEXin (KEFLEX) 500 MG capsule Take 1 capsule (500 mg) by mouth 4 times daily for 10 days, Disp-40 capsule, R-0, Local Print      sulfamethoxazole-trimethoprim (BACTRIM DS) 800-160 MG tablet Take 1 tablet by mouth 2 times daily for 10 days, Disp-20 tablet, R-0, Local Print                =================================================================    HPI    Patient information was obtained from: patient & daughter     Use of Intrepreter: N/A        Zoearl X Her is a 59 year old female with pertinent medical history of asthma, DM II (no insulin), fibromyalgia who presents to the emergency department for evaluation of wound check.    The patient reports fishing on 5/23/2024, [place], when she tripped on the rocks/dry sand and fell.  She scrapped her left lower leg, skin came off, but did not get any water into her wound. She reports washing her leg last night, and is able to walk normally, however when pressure is applied on her leg, she feels like her skin is \"burning.\" Prior to her fall, she did not feel dizzy, have chest pain or shortness of breath, nor did she hit her head.     Today, the patient reports having a left sided throbbing headache, along with not being able to sleep due to her leg pain. She said her skin on her left leg feels hot and she has chills. Patient last took tylenol this morning for her symptoms. Reports no fever, visual changes, nausea or vomiting currently. Not on blood thinners.     Per chart review, patient presented to [facility] ED on 9/13/2023 for fall and leg pain. Patient was fishing and fell on her left shin. Labs CBC showed no acute anemia and normal platelets, BMP without electrolyte derangement, acidosis or acute kidney injury. Imaging XR of left tibia and fibula with no evidence of acute bony injury. Patient discharged in stable condition and instructed " "to follow up with primary care.     PAST MEDICAL HISTORY:  History reviewed. No pertinent past medical history.    PAST SURGICAL HISTORY:  History reviewed. No pertinent surgical history.    CURRENT MEDICATIONS:    Prior to Admission Medications   Prescriptions Last Dose Informant Patient Reported? Taking?   loperamide (IMODIUM A-D) 2 MG tablet   No No   Sig: Take 1 tablet (2 mg) by mouth 4 times daily as needed for diarrhea   loperamide (IMODIUM) 2 mg capsule   No No   Sig: [LOPERAMIDE (IMODIUM) 2 MG CAPSULE] Take 1 capsule (2 mg total) by mouth 4 (four) times a day as needed for diarrhea.   ondansetron (ZOFRAN-ODT) 4 MG disintegrating tablet   No No   Sig: [ONDANSETRON (ZOFRAN-ODT) 4 MG DISINTEGRATING TABLET] Take 1 tablet (4 mg total) by mouth every 8 (eight) hours as needed.      Facility-Administered Medications: None       ALLERGIES:  No Known Allergies    FAMILY HISTORY:  Family History   Problem Relation Age of Onset    No Known Problems Mother     No Known Problems Father     No Known Problems Sister     No Known Problems Daughter     No Known Problems Maternal Grandmother     No Known Problems Maternal Grandfather     No Known Problems Paternal Grandmother     No Known Problems Paternal Grandfather     No Known Problems Maternal Aunt     No Known Problems Paternal Aunt     Hereditary Breast and Ovarian Cancer Syndrome No family hx of     Breast Cancer No family hx of     Cancer No family hx of     Colon Cancer No family hx of     Endometrial Cancer No family hx of     Ovarian Cancer No family hx of        SOCIAL HISTORY:        VITALS:    First Vitals:  No data found.      No data found.        PHYSICAL EXAM  VITAL SIGNS: /82   Pulse 84   Temp 98.7  F (37.1  C) (Oral)   Resp 18   Ht 1.499 m (4' 11\")   Wt 74 kg (163 lb 1.6 oz)   SpO2 96%   BMI 32.94 kg/m     Constitutional: Awake, alert, No acute distress.    HENT: Normocephalic, atraumatic. No tenderness to palpation overlying temporal region. " Posterior pharynx within normal limits, uvula midline, mucous membranes moist. No nuchal rigidity. Full ROM of the neck.  Eyes: Conjunctiva normal. Visual fields full to confrontation.  Pulmonary: Breathing easily, clear and equal breath sounds.  No respiratory distress.  Cardiovascular:  Regular rate and rhythm, radial, dorsalis pedis, and posterior tibialis pulses present, symmetric, and within normal limits.   GI: Soft, nondistended, nontender, no rebound or guarding. Bowel sounds normal.  Extremities:  Moving all extremities spontaneously. No gross deformity. Extremities are warm and well perfused.  Left lower extremity with two abrasions to anterior shin with surrounding erythema and increased warmth. No purulent drainage. No areas of fluctuance. No crepitus or pain out of proportion. No bony tenderness to palpation to ankle, posterior medial or lateral malleolus, tibia, fibula, joint lines, patella, popliteal fossa.  Distal CMS intact, compartments soft,  Integument: Exposed areas of skin warm, dry.  Neurologic: Alert & oriented to person, place and time.  GCS 15. Patient articulates well with no dysarthria. Upper and lower extremity strength 5/5 and symmetric. Distal sensation grossly intact in upper and lower extremities. No pronator drift. Finger-nose-finger, Straight leg raise, and heel to shin intact. No tremor. CN II-XII intact.  Psychiatric: Affect normal, Judgment normal, Mood normal. No obvious hallucinations at this time.        RADIOLOGY/LAB:  Reviewed all pertinent imaging. Please see official radiology report.  All pertinent labs reviewed and interpreted.         EKG:  None      PROCEDURES:  None        Virgen IGLESIAS, am serving as a scribe to document services personally performed by Maribel Casey PA-C, based on my observation and the provider's statements to me. I, Maribel Casey PA-C attest that Virgen Purcell is acting in a scribe capacity, has observed my performance of the  services and has documented them in accordance with my direction.         Maribel Casey PA-C  Emergency Medicine  [facility] EMERGENCY DEPARTMENT  [address] [place] [id number] [phone]  Dept: [phone]      Maribel Casey PA-C  06/05/24 [id number]

## 2024-06-03 NOTE — ED TRIAGE NOTES
Pt arrives with  and daughter. Pt reports she was fishing on Thursday and slipped and fell and cut her leg on a rock. Pt now is experiencing chills, redness to the area, and swelling. Pt is able to walk on it. Pt also reports having a headache. Pt denies hitting head when she fell.

## 2024-06-03 NOTE — ED PROVIDER NOTES
"Emergency Department Midlevel Supervisory Note     I had a face to face encounter with this patient seen by the Advanced Practice Provider (BAILEY). I personally made/approved the management plan and take responsibility for the patient management. I personally saw patient and performed a substantive portion of the visit including all aspects of the medical decision making.     ED Course:  9:40 PM Maribel Casey PA-C staffed patient with me. I agree with their assessment and plan of management, and I will see the patient.  9:53 PM I met with the patient to introduce myself, gather additional history, perform my initial exam, and discuss the plan.     Brief HPI:     Chicho Jin is a 59 year old female who presents for evaluation of  wound check. Fell on 5/23/24 and scrapped her left lower leg, skin came off, but did not get any water into her wound. Able to walk normally, but \"burning\" sensation in leg when pressure is applied on it.     I, Cande Lizama, am serving as a scribe to document services personally performed by José Miguel Sosa MD based on my observations and the provider's statements to me.   I, José Miguel Sosa MD attest that Cande Lizama was acting in a scribe capacity, has observed my performance of the services and has documented them in accordance with my direction.    Brief Physical Exam: /86   Pulse 91   Temp 98.7  F (37.1  C) (Oral)   Resp 18   Ht 1.499 m (4' 11\")   Wt 74 kg (163 lb 1.6 oz)   SpO2 96%   BMI 32.94 kg/m    Constitutional:  Alert, in no acute distress  EYES: Conjunctivae clear  HENT:  Atraumatic  Respiratory:  Respirations even, unlabored, in no acute respiratory distress  Cardiovascular:  Regular rate and rhythm, good peripheral perfusion  GI: Soft, non-distended, non-tender  Musculoskeletal: Patient with dry eschars on the proximal aspect of the left anterior tibial region.  More inferiorly in the stocking distribution patient with diffuse erythema and tenderness.  Relative " sparing posteriorly.    Integument: Warm & dry.   Neurologic:  Alert & oriented, speech clear and fluent, no focal deficits noted  Psych: Normal mood and affect       MDM:  Patient presenting with left leg pain after recent fall striking her shin while fishing.  Was not submerged.  Patient with underlying diabetes.  Now with a burning discomfort and redness.  Examination consistent with cellulitis.  No evidence of toxicity.  No indications for laboratory evaluation.  Will discharge with appropriate antibiotics.  ED Course as of 06/02/24 2203   [place] Jun 02, 2024 2138 The patient is a 59-year-old female with history of type 2 diabetes presenting to the emergency department for evaluation of wound check.       Left lower extremity cellulitis    ey portions of procedures documented in BAILEY/midlevel note, see midlevel note for further details.    José Miguel Sosa MD  [facility] EMERGENCY DEPARTMENT  [address] [place] [id number]  [phone]     José Miguel Sosa MD  06/02/24 [id number]

## 2024-06-03 NOTE — DISCHARGE INSTRUCTIONS
You were seen in the emergency department today for your symptoms.  Please take the antibiotics as prescribed.  Please follow-up with your primary care clinician within the next 48 to 72 hours for close recheck.  Please return to the emergency department if you develop any new, worsening, or concerning symptoms including but not limited to fevers, pus draining from the wound, increased pain, spreading redness, increased swelling.

## 2024-06-05 ENCOUNTER — HOSPITAL ENCOUNTER (EMERGENCY)
Facility: HOSPITAL | Age: 59
Discharge: HOME | End: 2024-06-05
Attending: EMERGENCY MEDICINE | Admitting: EMERGENCY MEDICINE
Payer: COMMERCIAL

## 2024-06-05 VITALS
BODY MASS INDEX: 33.16 KG/M2 | HEART RATE: 78 BPM | RESPIRATION RATE: 18 BRPM | SYSTOLIC BLOOD PRESSURE: 143 MMHG | TEMPERATURE: 96.3 F | OXYGEN SATURATION: 97 % | WEIGHT: 164.46 LBS | DIASTOLIC BLOOD PRESSURE: 88 MMHG | HEIGHT: 59 IN

## 2024-06-05 DIAGNOSIS — L03.116 CELLULITIS OF LEFT LOWER EXTREMITY: ICD-10-CM

## 2024-06-05 LAB
ALBUMIN SERPL BCG-MCNC: 3.9 G/DL (ref 3.5–5.2)
ALP SERPL-CCNC: 112 U/L (ref 40–150)
ALT SERPL W P-5'-P-CCNC: 21 U/L (ref 0–50)
ANION GAP SERPL CALCULATED.3IONS-SCNC: 14 MMOL/L (ref 7–15)
AST SERPL W P-5'-P-CCNC: 21 U/L (ref 0–45)
BASOPHILS # BLD AUTO: 0 10E3/UL (ref 0–0.2)
BASOPHILS NFR BLD AUTO: 0 %
BILIRUB SERPL-MCNC: 0.2 MG/DL
BUN SERPL-MCNC: 16.8 MG/DL (ref 6–20)
CALCIUM SERPL-MCNC: 9.2 MG/DL (ref 8.6–10)
CHLORIDE SERPL-SCNC: 104 MMOL/L (ref 98–107)
CREAT SERPL-MCNC: 0.9 MG/DL (ref 0.51–0.95)
CRP SERPL-MCNC: 33.6 MG/L
DEPRECATED HCO3 PLAS-SCNC: 19 MMOL/L (ref 22–29)
EGFRCR SERPLBLD CKD-EPI 2021: 74 ML/MIN/1.73M2
EOSINOPHIL # BLD AUTO: 0.1 10E3/UL (ref 0–0.7)
EOSINOPHIL NFR BLD AUTO: 1 %
ERYTHROCYTE [DISTWIDTH] IN BLOOD BY AUTOMATED COUNT: 13.5 % (ref 10–15)
GLUCOSE SERPL-MCNC: 201 MG/DL (ref 70–99)
HCT VFR BLD AUTO: 43.8 % (ref 35–47)
HGB BLD-MCNC: 13.9 G/DL (ref 11.7–15.7)
IMM GRANULOCYTES # BLD: 0 10E3/UL
IMM GRANULOCYTES NFR BLD: 0 %
LACTATE SERPL-SCNC: 2 MMOL/L (ref 0.7–2)
LYMPHOCYTES # BLD AUTO: 1.3 10E3/UL (ref 0.8–5.3)
LYMPHOCYTES NFR BLD AUTO: 14 %
MCH RBC QN AUTO: 27.6 PG (ref 26.5–33)
MCHC RBC AUTO-ENTMCNC: 31.7 G/DL (ref 31.5–36.5)
MCV RBC AUTO: 87 FL (ref 78–100)
MONOCYTES # BLD AUTO: 0.8 10E3/UL (ref 0–1.3)
MONOCYTES NFR BLD AUTO: 8 %
NEUTROPHILS # BLD AUTO: 7.3 10E3/UL (ref 1.6–8.3)
NEUTROPHILS NFR BLD AUTO: 77 %
NRBC # BLD AUTO: 0 10E3/UL
NRBC BLD AUTO-RTO: 0 /100
PLATELET # BLD AUTO: 243 10E3/UL (ref 150–450)
POTASSIUM SERPL-SCNC: 4.7 MMOL/L (ref 3.4–5.3)
PROT SERPL-MCNC: 7.7 G/DL (ref 6.4–8.3)
RBC # BLD AUTO: 5.03 10E6/UL (ref 3.8–5.2)
SODIUM SERPL-SCNC: 137 MMOL/L (ref 135–145)
WBC # BLD AUTO: 9.5 10E3/UL (ref 4–11)

## 2024-06-05 PROCEDURE — 80053 COMPREHEN METABOLIC PANEL: CPT | Performed by: EMERGENCY MEDICINE

## 2024-06-05 PROCEDURE — 85025 COMPLETE CBC W/AUTO DIFF WBC: CPT | Performed by: EMERGENCY MEDICINE

## 2024-06-05 PROCEDURE — 99283 EMERGENCY DEPT VISIT LOW MDM: CPT

## 2024-06-05 PROCEDURE — 86140 C-REACTIVE PROTEIN: CPT | Performed by: EMERGENCY MEDICINE

## 2024-06-05 PROCEDURE — 83605 ASSAY OF LACTIC ACID: CPT | Performed by: EMERGENCY MEDICINE

## 2024-06-05 PROCEDURE — 87040 BLOOD CULTURE FOR BACTERIA: CPT | Performed by: EMERGENCY MEDICINE

## 2024-06-05 PROCEDURE — 36415 COLL VENOUS BLD VENIPUNCTURE: CPT | Performed by: EMERGENCY MEDICINE

## 2024-06-05 NOTE — ED PROVIDER NOTES
EMERGENCY DEPARTMENT ENCOUNTER      NAME: Chicho ONTIVEROS Her  AGE: 59 year old female  YOB: 1965  MRN: [id number]  EVALUATION DATE & TIME: No admission date for patient encounter.    PCP: Swetha Mancilla    ED PROVIDER: Janessa Cleary DO      Chief Complaint   Patient presents with    Wound Check         FINAL IMPRESSION:  1. Cellulitis of left lower extremity          ED COURSE & MEDICAL DECISION MAKING:    Pertinent Labs & Imaging studies reviewed. (See chart for details)  6:40 PM I met the patient and performed my initial interview and exam.    59 year old female presents to the Emergency Department for evaluation of wound to her left lower extremity.  Recent diagnosed with cellulitis.  Started on cephalexin and Bactrim.  She has a scabbed wound to her proximal tibia, area of erythema around this seems to be improving.  She has another scabbed wound to the lateral lower side of her leg.  There is perhaps some mild surrounding erythema but no significant increased warmth.  No calf pain to suggest DVT.  Not consistent with abscess or necrotizing infection.  She has intact pulses.  She clinically looks well.  Labs had been obtained.  No leukocytosis.  No elevation lactic acid.  CRP is mildly elevated.  Given overall reassuring exam I encourage patient to continue her antibiotics.  I would focus more attention on keeping her leg elevated to decrease any swelling.  We did discuss return if worsening symptoms despite a couple more days of antibiotic therapy.    At the conclusion of the encounter I discussed the results of all of the tests and the disposition. The questions were answered. The patient or family acknowledged understanding and was agreeable with the care plan.     Medical Decision Making  Obtained supplemental history:Supplemental history obtained?: Documented in chart  Reviewed external records: External records reviewed?: Documented in chart  Care impacted by chronic illness:Diabetes  Care  significantly affected by social determinants of health:N/A  Did you consider but not order tests?: Work up considered but not performed and documented in chart, if applicable  Did you interpret images independently?: Independent interpretation of ECG and images noted in documentation, when applicable.  Consultation discussion with other provider:Did you involve another provider (consultant, , pharmacy, etc.)?: No  Discharge. No recommendations on prescription strength medication(s). See documentation for any additional details.      MEDICATIONS GIVEN IN THE EMERGENCY:  Medications - No data to display    NEW PRESCRIPTIONS STARTED AT TODAY'S ER VISIT  Discharge Medication List as of 6/5/2024  9:25 PM             =================================================================    HPI    Patient information was obtained from: Patient and daughter    Use of :Yes ( In Person, daughter) - Language Hmong        Zoua X Her is a 59 year old female with a pertinent history of diabetes who presents to this ED  for evaluation of wound to her left lower leg.  Patient reports she fell and scraped her left lower leg at the end of May.  She was able to walk normally but some burning sensation.  She had then noted some redness.  She was seen in this department on 6/2.  This chart was reviewed.  Patient was started on cephalexin and Bactrim.  Her more proximal wound was outlined.  She reports the more distal wound seems to have spreading redness and burning.  No fevers or chills.  She does elevate her leg which helps but then when she is walking around it seems to get worse.        REVIEW OF SYSTEMS   Per HPI    PAST MEDICAL HISTORY:  No past medical history on file.    PAST SURGICAL HISTORY:  No past surgical history on file.        CURRENT MEDICATIONS:    cephALEXin (KEFLEX) 500 MG capsule  loperamide (IMODIUM A-D) 2 MG tablet  loperamide (IMODIUM) 2 mg capsule  ondansetron (ZOFRAN-ODT) 4 MG disintegrating  tablet  sulfamethoxazole-trimethoprim (BACTRIM DS) 800-160 MG tablet         ALLERGIES:  No Known Allergies    FAMILY HISTORY:  Family History   Problem Relation Age of Onset    No Known Problems Mother     No Known Problems Father     No Known Problems Sister     No Known Problems Daughter     No Known Problems Maternal Grandmother     No Known Problems Maternal Grandfather     No Known Problems Paternal Grandmother     No Known Problems Paternal Grandfather     No Known Problems Maternal Aunt     No Known Problems Paternal Aunt     Hereditary Breast and Ovarian Cancer Syndrome No family hx of     Breast Cancer No family hx of     Cancer No family hx of     Colon Cancer No family hx of     Endometrial Cancer No family hx of     Ovarian Cancer No family hx of        SOCIAL HISTORY:   Social History     Socioeconomic History    Marital status:      Social Determinants of Health     Financial Resource Strain: Low Risk  (11/9/2022)    [facility] [employer] [facility], [facility]    Financial Resource Strain     Difficulty of Paying Living Expenses: 3   Food Insecurity: No Food Insecurity (11/9/2022)    Received from [employer] [facility], [facility] [facility]    Food Insecurity     Worried About Running Out of Food in the Last Year: 1   Transportation Needs: No Transportation Needs (11/9/2022)    Received from [employer] [facility], [employer] [facility]    Transportation Needs     Lack of Transportation (Medical): 1    Received from [employer] [facility], [facility]    Social Connections   Housing Stability: Low Risk  (11/9/2022)    Received from [employer] [facility], [facility]    Housing Stability     Unable to Pay for Housing in the Last Year: 1  "      VITALS:  BP (!) 143/88   Pulse 78   Temp (!) 96.3  F (35.7  C) (Temporal)   Resp 18   Ht 1.499 m (4' 11\")   Wt 74.6 kg (164 lb 7.4 oz)   SpO2 97%   BMI 33.22 kg/m      PHYSICAL EXAM    Physical Exam  Vitals and nursing note reviewed.   Constitutional:       General: She is not in acute distress.     Appearance: Normal appearance.   HENT:      Head: Normocephalic and atraumatic.   Eyes:      Pupils: Pupils are equal, round, and reactive to light.   Cardiovascular:      Rate and Rhythm: Normal rate and regular rhythm.      Pulses:           Dorsalis pedis pulses are 2+ on the right side and 2+ on the left side.   Pulmonary:      Effort: Pulmonary effort is normal.   Abdominal:      General: Abdomen is flat.   Musculoskeletal:         General: Normal range of motion.   Skin:     General: Skin is warm and dry.      Comments: Scabbed wound to left proximal tibia with some mild surrounding erythema, does not go outside of marked margins  Additional small scabbed wound to lateral left lower leg, some mild surrounding erythema, no crepitus, no increased warmth   Neurological:      General: No focal deficit present.      Mental Status: She is alert.      Gait: Gait normal.           LAB:  All pertinent labs reviewed and interpreted.  Labs Ordered and Resulted from Time of ED Arrival to Time of ED Departure   COMPREHENSIVE METABOLIC PANEL - Abnormal       Result Value    Sodium 137      Potassium 4.7      Carbon Dioxide (CO2) 19 (*)     Anion Gap 14      Urea Nitrogen 16.8      Creatinine 0.90      GFR Estimate 74      Calcium 9.2      Chloride 104      Glucose 201 (*)     Alkaline Phosphatase 112      AST 21      ALT 21      Protein Total 7.7      Albumin 3.9      Bilirubin Total 0.2     CRP INFLAMMATION - Abnormal    CRP Inflammation 33.60 (*)    LACTIC ACID WHOLE BLOOD WITH 1X REPEAT IN 2 HR WHEN >2 - Normal    Lactic Acid, Initial 2.0     CBC WITH PLATELETS AND DIFFERENTIAL    WBC Count 9.5      RBC Count " 5.03      Hemoglobin 13.9      Hematocrit 43.8      MCV 87      MCH 27.6      MCHC 31.7      RDW 13.5      Platelet Count 243      % Neutrophils 77      % Lymphocytes 14      % Monocytes 8      % Eosinophils 1      % Basophils 0      % Immature Granulocytes 0      NRBCs per 100 WBC 0      Absolute Neutrophils 7.3      Absolute Lymphocytes 1.3      Absolute Monocytes 0.8      Absolute Eosinophils 0.1      Absolute Basophils 0.0      Absolute Immature Granulocytes 0.0      Absolute NRBCs 0.0     BLOOD CULTURE       Janessa Cleary DO  Emergency Medicine  [facility] EMERGENCY DEPARTMENT  [address] [place] [id number] [phone]  Dept: [phone]       Janessa Cleary DO  06/06/24 0054

## 2024-06-05 NOTE — ED TRIAGE NOTES
"Pt recently seen her for an infection of her leg on day 3 of antibiotics \"states it is not getting better\".       Triage Assessment (Adult)       Row Name 06/05/24 0357          Triage Assessment    Airway WDL WDL        Respiratory WDL    Respiratory WDL WDL        Skin Circulation/Temperature WDL    Skin Circulation/Temperature WDL WDL        Peripheral/Neurovascular WDL    Peripheral Neurovascular WDL WDL        Cognitive/Neuro/Behavioral WDL    Cognitive/Neuro/Behavioral WDL WDL                     "

## 2024-06-06 NOTE — DISCHARGE INSTRUCTIONS
Your blood work shows some elevation in inflammatory markers but otherwise no increase in white blood cell count to suggest serious infection  Continue your antibiotics, I would not change them at this time  Keep wounds clean and dry  Keep leg elevated more often which will help with swelling and redness  Return if worsening symptoms in 2 days

## 2024-06-07 ENCOUNTER — HOSPITAL ENCOUNTER (EMERGENCY)
Facility: HOSPITAL | Age: 59
Discharge: HOME | End: 2024-06-07
Admitting: EMERGENCY MEDICINE
Payer: COMMERCIAL

## 2024-06-07 VITALS
BODY MASS INDEX: 33.06 KG/M2 | SYSTOLIC BLOOD PRESSURE: 147 MMHG | TEMPERATURE: 98.4 F | OXYGEN SATURATION: 97 % | WEIGHT: 164 LBS | HEIGHT: 59 IN | HEART RATE: 90 BPM | RESPIRATION RATE: 16 BRPM | DIASTOLIC BLOOD PRESSURE: 98 MMHG

## 2024-06-07 DIAGNOSIS — S81.011A LACERATION OF RIGHT KNEE, INITIAL ENCOUNTER: ICD-10-CM

## 2024-06-07 PROCEDURE — 99283 EMERGENCY DEPT VISIT LOW MDM: CPT

## 2024-06-07 PROCEDURE — 250N000009 HC RX 250: Performed by: EMERGENCY MEDICINE

## 2024-06-07 PROCEDURE — 12002 RPR S/N/AX/GEN/TRNK2.6-7.5CM: CPT

## 2024-06-07 RX ORDER — GINSENG 100 MG
CAPSULE ORAL ONCE
Status: COMPLETED | OUTPATIENT
Start: 2024-06-07 | End: 2024-06-07

## 2024-06-07 RX ADMIN — BACITRACIN: 500 OINTMENT TOPICAL at 18:12

## 2024-06-07 ASSESSMENT — ACTIVITIES OF DAILY LIVING (ADL)
ADLS_ACUITY_SCORE: 33
ADLS_ACUITY_SCORE: 33

## 2024-06-07 NOTE — DISCHARGE INSTRUCTIONS
You are seen and evaluated here in the emergency department for your knee injury and laceration.  Fortunately, no concern for fracture and declined x-ray which I feel is reasonable.  Wound was repaired with 5 stitches that should be removed in 10 to 14 days.  Wash at least once daily with soap and water.  You are on antibiotics for a cellulitis to your left leg which is improving and recommend close follow-up with primary care on Monday as scheduled.  Continue the antibiotics as scheduled.    If you develop increased redness, swelling, purulent drainage, fevers or other concerns please return to the emergency department.

## 2024-06-07 NOTE — ED PROVIDER NOTES
EMERGENCY DEPARTMENT ENCOUNTER      NAME: Chicho ONTIVEROS Her  AGE: 59 year old female  YOB: 1965  MRN: [id number]  EVALUATION DATE & TIME: 6/7/2024  5:01 PM    PCP: No Ref-Primary, Physician    ED PROVIDER: Chloe Barragan PA-C      Chief Complaint   Patient presents with    Laceration         FINAL IMPRESSION:  1. Laceration of right knee, initial encounter        MEDICAL DECISION MAKING:    Pertinent Labs & Imaging studies reviewed. (See chart for details)   Chicho Jin is a 59 year old female with a pertinent history of fibromyalgia, type II diabetes, asthma who presents to this ED via private vehicle for evaluation of a laceration to the right knee.  The patient was walking up the stairs into her house when she tripped, fell and landed on both of her knees and hands.  She got up and noticed that she had a laceration to her right knee that needed repair so she presented to the emergency department.  On my evaluation, patient was slightly hypertensive at 144/80 and slightly tachycardic at 101 but otherwise vitally stable.  Examination with 6 cm laceration to the right distal knee, distal CMS intact.  Normal range of motion of the right knee without any bony tenderness, no tenderness to the right hip or ankle with normal range of motion.    As patient did fall and started to complain of some more severe knee pain I offered x-ray which patient declined which I feel is reasonable as she did not have any bony tenderness on exam with normal range of motion of the knee.  Wound was repaired as detailed below and patient tolerated the procedure well.  Tetanus up-to-date.  Patient does not have a history of diabetes and I did consider starting her on antibiotics however, she is still on Keflex and Bactrim from recent cellulitis infection and told to continue this which will cover for any possible new wound infection.  She does have close follow-up with primary care on Monday and I told her to keep this appointment.   We discussed removal of stitches in the next 10 to 14 days as well as signs and symptoms of infection, wound care and reasons to return to the emergency department.  Patient and daughter were in agreement understanding with the plan of care and all questions were to the best my ability.  She was discharged home in stable condition.    Medical Decision Making  Obtained supplemental history:Supplemental history obtained?: No  Reviewed external records: External records reviewed?: Outpatient Record: 6/2/2024 and sick/5/24.  Was seen in our emergency department for cellulitis.  Was discharged home initially on Bactrim and Keflex and reevaluation 3 days later told to continue these.  Care impacted by chronic illness:Chronic Pain and Diabetes  Care significantly affected by social determinants of health:Access to Medical Care  Did you consider but not order tests?: Work up considered but not performed and documented in chart, if applicable  Did you interpret images independently?: Independent interpretation of ECG and images noted in documentation, when applicable.  Consultation discussion with other provider:Did you involve another provider (consultant, MH, pharmacy, etc.)?: No  Discharge. I recommended the patient continue their current prescription strength medication(s): Bactrim and Keflex. See documentation for any additional details.     ED COURSE:  5:45 PM I met with the patient, obtained history, performed an initial exam, and discussed options and plan for diagnostics and treatment here in the ED.    6:05 PM Patient discharged after being provided with extensive anticipatory guidance and given return precautions, importance of PCP follow-up emphasized.    At the conclusion of the encounter I discussed the results of all of the tests and the disposition. The questions were answered. The patient or family acknowledged understanding and was agreeable with the care plan.     MEDICATIONS GIVEN IN THE  EMERGENCY:  Medications   bacitracin ointment ( Topical $Given 6/7/24 1812)       NEW PRESCRIPTIONS STARTED AT TODAY'S ER VISIT  Discharge Medication List as of 6/7/2024  6:14 PM               =================================================================    HPI:    Patient information was obtained from: The patient and daughter    Use of Interpretor: N/A          Chicho ONTIVEROS Her is a 59 year old female with a pertinent history of fibromyalgia, type II diabetes, asthma who presents to this ED via private vehicle for evaluation of a laceration to the right knee.  The patient was walking up the stairs into her house when she tripped, fell and landed on both of her knees and hands.  She got up and noticed that she had a laceration to her right knee that needed repair so she presented to the emergency department.  On my evaluation, patient denies hitting her head.  No loss of consciousness.  No numbness or tingling or weakness in the legs.  No significant knee pain, ankle pain or hip pain.  No pain medications prior to arrival.  No other concerns voiced at this time.      REVIEW OF SYSTEMS:  Negative unless otherwise stated in the above HPI.       PAST MEDICAL HISTORY:  No past medical history on file.    PAST SURGICAL HISTORY:  No past surgical history on file.        CURRENT MEDICATIONS:    No current facility-administered medications for this encounter.    Current Outpatient Medications:     cephALEXin (KEFLEX) 500 MG capsule, Take 1 capsule (500 mg) by mouth 4 times daily for 10 days, Disp: 40 capsule, Rfl: 0    loperamide (IMODIUM A-D) 2 MG tablet, Take 1 tablet (2 mg) by mouth 4 times daily as needed for diarrhea, Disp: 10 tablet, Rfl: 0    loperamide (IMODIUM) 2 mg capsule, [LOPERAMIDE (IMODIUM) 2 MG CAPSULE] Take 1 capsule (2 mg total) by mouth 4 (four) times a day as needed for diarrhea., Disp: 12 capsule, Rfl: 0    ondansetron (ZOFRAN-ODT) 4 MG disintegrating tablet, [ONDANSETRON (ZOFRAN-ODT) 4 MG DISINTEGRATING  TABLET] Take 1 tablet (4 mg total) by mouth every 8 (eight) hours as needed., Disp: 20 tablet, Rfl: 0    sulfamethoxazole-trimethoprim (BACTRIM DS) 800-160 MG tablet, Take 1 tablet by mouth 2 times daily for 10 days, Disp: 20 tablet, Rfl: 0      ALLERGIES:  No Known Allergies    FAMILY HISTORY:  Family History   Problem Relation Age of Onset    No Known Problems Mother     No Known Problems Father     No Known Problems Sister     No Known Problems Daughter     No Known Problems Maternal Grandmother     No Known Problems Maternal Grandfather     No Known Problems Paternal Grandmother     No Known Problems Paternal Grandfather     No Known Problems Maternal Aunt     No Known Problems Paternal Aunt     Hereditary Breast and Ovarian Cancer Syndrome No family hx of     Breast Cancer No family hx of     Cancer No family hx of     Colon Cancer No family hx of     Endometrial Cancer No family hx of     Ovarian Cancer No family hx of        SOCIAL HISTORY:   Social History     Socioeconomic History    Marital status:      Social Determinants of Health     Financial Resource Strain: Low Risk  (11/9/2022)    [facility], [facility] [employer] [facility]    Financial Resource Strain     Difficulty of Paying Living Expenses: 3   Food Insecurity: No Food Insecurity (11/9/2022)    [facility]    Food Insecurity     Worried About Running Out of Food in the Last Year: 1   Transportation Needs: No Transportation Needs (11/9/2022)    Received from [employer] [facility], [facility] [employer] [facility]    Transportation Needs     Lack of Transportation (Medical): 1    [facility] [employer] [facility]    Social Connections   Housing Stability: Low Risk  (11/9/2022)     "[facility]    Housing Stability     Unable to Pay for Housing in the Last Year: 1       VITALS:  Patient Vitals for the past 24 hrs:   BP Temp Temp src Pulse Resp SpO2 Height Weight   06/07/24 1815 (!) 147/98 -- -- 90 -- 97 % -- --   06/07/24 1510 (!) 144/88 98.4  F (36.9  C) Oral 101 16 97 % 1.499 m (4' 11\") 74.4 kg (164 lb)       PHYSICAL EXAM    Constitutional: Well developed, Well nourished, NAD  HENT: Normocephalic, Atraumatic, Bilateral external ears normal, Oropharynx normal, mucous membranes moist, Nose normal.   Neck: Normal range of motion, No tenderness, Supple, No stridor.  Eyes: Eyes track normally throughout exam, Conjunctiva normal, No discharge.   Respiratory: Speaks full sentences easily. No cough.  Cardiovascular: Heart rate and blood pressure as above.   Musculoskeletal: 2+ DP pulses. No edema. No cyanosis, No clubbing. Good range of motion in all major joints. No tenderness to palpation or major deformities noted.  Integument: Scattered ecchymosis to the extremities.  Abrasion to the left lower extremity with previous outlined area for cellulitis, mild erythema within this, no erythema extending past the borders.  Right knee with 6 cm laceration just distal to the knee, no active bleeding.  Wound explored no foreign body.  Distal CMS intact.  Neurologic: Alert & oriented x 3, Normal motor function, Normal sensory function, No focal deficits noted. Normal gait.  Psychiatric: Affect normal, Judgment normal, Mood normal. Cooperative.    LAB:  All pertinent labs reviewed and interpreted.  No results found for this or any previous visit (from the past 24 hour(s)).      RADIOLOGY:  Reviewed all pertinent imaging. Please see official radiology report.  No orders to display       PROCEDURES:   PROCEDURE: Laceration Repair   INDICATIONS: Laceration   PROCEDURE PROVIDER: Chloe Barragan PA-C   SITE: Right knee "   TYPE/SIZE: simple, clean, and no foreign body visualized  6 cm (total length)   FUNCTIONAL ASSESSMENT: Distal sensation, circulation, motor, and tendon function intact   MEDICATION: 5 mLs of 1% Lidocaine with epinephrine   PREPARATION: scrubbing with Normal saline and Hibiclens   DEBRIDEMENT: no debridement   CLOSURE:  Superficial layer closed with 5 stitches of 3-0 Ethilon simple interrupted and horizontal mattress    Total number of sutures/staples placed: 5       Chloe Barragan PA-C  Emergency Medicine  [facility]  6/7/2024      Chloe Barragan PA-C  06/07/24 1847

## 2024-06-07 NOTE — ED TRIAGE NOTES
Patient presents here for evaluation of an injury to her right knee incurred by a fall that occurred about one hour prior to arrival. She states that she tripped and fell. Wound is just below the patella. She reports no limitation with flexion or extension of the joint and that the joint does not feel unstable with weight bearing. Wound is about 3 cm long with gaped edges.      Triage Assessment (Adult)       Row Name 06/07/24 1512          Triage Assessment    Airway WDL WDL        Respiratory WDL    Respiratory WDL WDL        Skin Circulation/Temperature WDL    Skin Circulation/Temperature WDL WDL        Cardiac WDL    Cardiac WDL WDL        Peripheral/Neurovascular WDL    Peripheral Neurovascular WDL WDL        Cognitive/Neuro/Behavioral WDL    Cognitive/Neuro/Behavioral WDL WDL

## 2024-06-10 LAB — BACTERIA BLD CULT: NO GROWTH

## 2024-08-15 ENCOUNTER — HOSPITAL ENCOUNTER (EMERGENCY)
Facility: HOSPITAL | Age: 59
Discharge: HOME | End: 2024-08-15
Attending: STUDENT IN AN ORGANIZED HEALTH CARE EDUCATION/TRAINING PROGRAM | Admitting: STUDENT IN AN ORGANIZED HEALTH CARE EDUCATION/TRAINING PROGRAM
Payer: COMMERCIAL

## 2024-08-15 VITALS
BODY MASS INDEX: 32.25 KG/M2 | OXYGEN SATURATION: 97 % | TEMPERATURE: 98.1 F | RESPIRATION RATE: 20 BRPM | HEART RATE: 82 BPM | DIASTOLIC BLOOD PRESSURE: 102 MMHG | SYSTOLIC BLOOD PRESSURE: 205 MMHG | WEIGHT: 160 LBS | HEIGHT: 59 IN

## 2024-08-15 DIAGNOSIS — R04.0 EPISTAXIS: ICD-10-CM

## 2024-08-15 PROCEDURE — 99283 EMERGENCY DEPT VISIT LOW MDM: CPT

## 2024-08-15 PROCEDURE — 250N000009 HC RX 250: Performed by: STUDENT IN AN ORGANIZED HEALTH CARE EDUCATION/TRAINING PROGRAM

## 2024-08-15 RX ORDER — OXYMETAZOLINE HYDROCHLORIDE 0.05 G/100ML
2 SPRAY NASAL ONCE
Status: COMPLETED | OUTPATIENT
Start: 2024-08-15 | End: 2024-08-15

## 2024-08-15 RX ADMIN — OXYMETAZOLINE HCL 2 SPRAY: 0.05 SPRAY NASAL at 20:12

## 2024-08-15 ASSESSMENT — ACTIVITIES OF DAILY LIVING (ADL)
ADLS_ACUITY_SCORE: 35

## 2024-08-15 ASSESSMENT — COLUMBIA-SUICIDE SEVERITY RATING SCALE - C-SSRS
2. HAVE YOU ACTUALLY HAD ANY THOUGHTS OF KILLING YOURSELF IN THE PAST MONTH?: NO
1. IN THE PAST MONTH, HAVE YOU WISHED YOU WERE DEAD OR WISHED YOU COULD GO TO SLEEP AND NOT WAKE UP?: NO
6. HAVE YOU EVER DONE ANYTHING, STARTED TO DO ANYTHING, OR PREPARED TO DO ANYTHING TO END YOUR LIFE?: NO

## 2024-08-16 NOTE — DISCHARGE INSTRUCTIONS
Nosebleed care at home:  If your nosebleed recurs at home, you may try over-the-counter Afrin with a nasal clamp, according to the instructions below;  - Blow your nose (to clear the clots from your nose)  - Give 2 sprays of Afrin (oxymetazoline) into the affected nostril  - Immediately place nasal clamp, leave it in place for at least 20 minutes before checking  - If bleeding is still uncontrolled, may return to the ED for additional evaluation and treatment    Do not use the Afrin more than once per day. Do not use it on back-to-back days.     If the bleeding cannot be controlled with this, you can return to the ED for re-evaluation an we would consider other treatment options such as chemical cautery with silver nitrate or nasal packing with a Rapid Rhino (inflatable nasal tampon that stays in your nose for 3-5 days)    Your blood pressure was noted to be fairly elevated while here in the ED, though this could be situational related to stress of being in the ER.  We discussed possibly starting on blood pressure medication, though you declined, would rather wait and see how it goes at home.  Please check your blood pressure at home, if it remains elevated, contact your primary doctor about starting on something for blood pressure control.

## 2024-08-16 NOTE — ED NOTES
Bed: JNED-19  Expected date: 8/15/24  Expected time: 7:32 PM  Means of arrival: Ambulance  Comments:  Karime 68 yo F epistaxis   
[id number]: || ||00\01||False;

## 2024-08-16 NOTE — ED TRIAGE NOTES
Patient arrives via [facility] for nose bleed, started suddenly at work today around 1840. EMS gave patient suction for the blood running down her throat and she had about 100 ml of blood emesis.  IV was started and Zofran given to help control the N/V     Triage Assessment (Adult)       Row Name 08/15/24 1952          Triage Assessment    Airway WDL WDL        Respiratory WDL    Respiratory WDL WDL        Skin Circulation/Temperature WDL    Skin Circulation/Temperature WDL WDL        Cardiac WDL    Cardiac WDL WDL        Peripheral/Neurovascular WDL    Peripheral Neurovascular WDL WDL        Cognitive/Neuro/Behavioral WDL    Cognitive/Neuro/Behavioral WDL WDL

## 2024-08-16 NOTE — ED PROVIDER NOTES
EMERGENCY DEPARTMENT ENCOUNTER       ED Course & Medical Decision Making       Final Impression  59 year old female presents for evaluation of epistaxis that started about 6:30 PM.  Patient denies being on blood thinners.  Denies any nasal trauma sounds like it started spontaneously.  Denies history of severe or recurrent nosebleeds.  Blood thinners.  Patient has visible brisk bleeding from the right anterior plexus on exam.  Treated with Afrin and nasal clamp.  Observed for about 40 minutes in the ED and has not had any recurrence.  Had patient get up and walk around the department, also did not precipitate any recurrence of nosebleed.  Patient's blood pressure noted to be fairly elevated in the 200s here, though was in the 140s at triage.  Discussed this with patient and family, sounds like she does not have history of high blood pressure, typically is 130s when it is checked at other times.  Discussed potentially starting her on something for high blood pressure here in the ED and write a prescription for something, though patient declined, states that she wants to keep track of her blood pressure at home and contact her to primary doctor if it remains elevated.  Will discharge with supportive care instructions for epistaxis.    Prior to making a final disposition on this patient the results of patient's tests and other diagnostic studies were discussed with the patient. All questions were answered. Patient expressed understanding of the plan and was amenable to it.    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  Prior hemoglobin on 6/5/2024 was 13.9    Work Up:  Chart documentation includes  "differential considered and any EKGs or imaging independently interpreted by provider, where specified.  In additional to work up documented, I considered the following work up: CBC to evaluate hemoglobin, though patient is not tachycardic, has a heart rate in the 80s at time of evaluation.  Prior hemoglobin was 13.9    Complicating factors:  Care impacted by chronic illness: Obesity, diabetes, asthma  Care affected by social determinants of health: N/A    Disposition considerations: Discharge. No recommendations on prescription strength medication(s). See documentation for any additional details.        Medications   oxymetazoline (AFRIN) 0.05 % spray 2 spray (2 sprays Nasal $Given 8/15/24 2012)       New Prescriptions    No medications on file     Modified Medications    No medications on file     Final Impression     1. Epistaxis      Chief Complaint     Chief Complaint   Patient presents with    Epistaxis     HPI     Zoua X Her is a 59 year old female who presents for evaluation of epistaxis.    Physical Exam     BP (!) 205/102   Pulse 82   Temp 98.1  F (36.7  C) (Oral)   Resp 20   Ht 1.499 m (4' 11\")   Wt 72.6 kg (160 lb)   SpO2 97%   BMI 32.32 kg/m    Constitutional: Awake, alert, in no acute distress.  Head: Normocephalic, atraumatic.  ENT: Mucous membranes moist.  Left nostril essentially clear, no appreciable active bleeding.  Right nostril with fairly brisk bleeding from the anterior plexus.  Eyes: Conjunctiva normal.  Respiratory: Respirations even, unlabored, in no acute respiratory distress.  Cardiovascular: Regular rate and rhythm. Good peripheral perfusion.  Musculoskeletal: Moves all 4 extremities equally.  Integument: Warm, dry.  Neurologic: Alert & oriented x 3. Normal speech. Grossly normal motor and sensory function. No focal deficits noted.  Psychiatric: Normal mood    Labs & Imaging       Procedures        Jatinder Grossman MD  08/15/24 2209    "

## 2024-11-16 ENCOUNTER — APPOINTMENT (OUTPATIENT)
Dept: ULTRASOUND IMAGING | Facility: CLINIC | Age: 59
End: 2024-11-16
Attending: INTERNAL MEDICINE
Payer: COMMERCIAL

## 2024-11-16 ENCOUNTER — HOSPITAL ENCOUNTER (INPATIENT)
Facility: CLINIC | Age: 59
LOS: 5 days | Discharge: HOME | End: 2024-11-21
Attending: FAMILY MEDICINE | Admitting: STUDENT IN AN ORGANIZED HEALTH CARE EDUCATION/TRAINING PROGRAM
Payer: COMMERCIAL

## 2024-11-16 DIAGNOSIS — A41.9 SEPSIS WITHOUT ACUTE ORGAN DYSFUNCTION, DUE TO UNSPECIFIED ORGANISM (H): ICD-10-CM

## 2024-11-16 DIAGNOSIS — N12 PYELONEPHRITIS: ICD-10-CM

## 2024-11-16 DIAGNOSIS — E83.42 HYPOMAGNESEMIA: ICD-10-CM

## 2024-11-16 LAB
ALBUMIN UR-MCNC: 20 MG/DL
ANION GAP SERPL CALCULATED.3IONS-SCNC: 16 MMOL/L (ref 7–15)
APPEARANCE UR: ABNORMAL
BASOPHILS # BLD AUTO: 0 10E3/UL (ref 0–0.2)
BASOPHILS NFR BLD AUTO: 0 %
BILIRUB UR QL STRIP: NEGATIVE
BUN SERPL-MCNC: 10.8 MG/DL (ref 6–20)
CALCIUM SERPL-MCNC: 8.5 MG/DL (ref 8.8–10.4)
CHLORIDE SERPL-SCNC: 101 MMOL/L (ref 98–107)
COLOR UR AUTO: YELLOW
CREAT SERPL-MCNC: 0.96 MG/DL (ref 0.51–0.95)
EGFRCR SERPLBLD CKD-EPI 2021: 68 ML/MIN/1.73M2
EOSINOPHIL # BLD AUTO: 0.1 10E3/UL (ref 0–0.7)
EOSINOPHIL NFR BLD AUTO: 1 %
ERYTHROCYTE [DISTWIDTH] IN BLOOD BY AUTOMATED COUNT: 13.2 % (ref 10–15)
FLUAV RNA SPEC QL NAA+PROBE: NEGATIVE
FLUBV RNA RESP QL NAA+PROBE: NEGATIVE
GLUCOSE BLDC GLUCOMTR-MCNC: 141 MG/DL (ref 70–99)
GLUCOSE SERPL-MCNC: 96 MG/DL (ref 70–99)
GLUCOSE UR STRIP-MCNC: NEGATIVE MG/DL
HCO3 SERPL-SCNC: 22 MMOL/L (ref 22–29)
HCT VFR BLD AUTO: 40.1 % (ref 35–47)
HGB BLD-MCNC: 13 G/DL (ref 11.7–15.7)
HGB UR QL STRIP: ABNORMAL
IMM GRANULOCYTES # BLD: 0.1 10E3/UL
IMM GRANULOCYTES NFR BLD: 1 %
KETONES UR STRIP-MCNC: NEGATIVE MG/DL
LACTATE SERPL-SCNC: 2.4 MMOL/L (ref 0.7–2)
LACTATE SERPL-SCNC: 3.2 MMOL/L (ref 0.7–2)
LEUKOCYTE ESTERASE UR QL STRIP: ABNORMAL
LYMPHOCYTES # BLD AUTO: 0.5 10E3/UL (ref 0.8–5.3)
LYMPHOCYTES NFR BLD AUTO: 4 %
MAGNESIUM SERPL-MCNC: 1.5 MG/DL (ref 1.7–2.3)
MCH RBC QN AUTO: 27.3 PG (ref 26.5–33)
MCHC RBC AUTO-ENTMCNC: 32.4 G/DL (ref 31.5–36.5)
MCV RBC AUTO: 84 FL (ref 78–100)
MONOCYTES # BLD AUTO: 0.2 10E3/UL (ref 0–1.3)
MONOCYTES NFR BLD AUTO: 2 %
MUCOUS THREADS #/AREA URNS LPF: PRESENT /LPF
NEUTROPHILS # BLD AUTO: 11.8 10E3/UL (ref 1.6–8.3)
NEUTROPHILS NFR BLD AUTO: 93 %
NITRATE UR QL: NEGATIVE
NRBC # BLD AUTO: 0 10E3/UL
NRBC BLD AUTO-RTO: 0 /100
PH UR STRIP: 6.5 [PH] (ref 5–7)
PLATELET # BLD AUTO: 226 10E3/UL (ref 150–450)
POTASSIUM SERPL-SCNC: 3.6 MMOL/L (ref 3.4–5.3)
PROCALCITONIN SERPL IA-MCNC: 1.02 NG/ML
RBC # BLD AUTO: 4.76 10E6/UL (ref 3.8–5.2)
RBC URINE: 5 /HPF
RSV RNA SPEC NAA+PROBE: NEGATIVE
SARS-COV-2 RNA RESP QL NAA+PROBE: NEGATIVE
SODIUM SERPL-SCNC: 139 MMOL/L (ref 135–145)
SP GR UR STRIP: 1.02 (ref 1–1.03)
SQUAMOUS EPITHELIAL: 13 /HPF
UROBILINOGEN UR STRIP-MCNC: <2 MG/DL
WBC # BLD AUTO: 12.7 10E3/UL (ref 4–11)
WBC URINE: >182 /HPF

## 2024-11-16 PROCEDURE — 87040 BLOOD CULTURE FOR BACTERIA: CPT | Performed by: FAMILY MEDICINE

## 2024-11-16 PROCEDURE — 36415 COLL VENOUS BLD VENIPUNCTURE: CPT | Performed by: FAMILY MEDICINE

## 2024-11-16 PROCEDURE — 258N000003 HC RX IP 258 OP 636: Performed by: FAMILY MEDICINE

## 2024-11-16 PROCEDURE — 99285 EMERGENCY DEPT VISIT HI MDM: CPT | Mod: 25

## 2024-11-16 PROCEDURE — 81001 URINALYSIS AUTO W/SCOPE: CPT | Performed by: FAMILY MEDICINE

## 2024-11-16 PROCEDURE — 87637 SARSCOV2&INF A&B&RSV AMP PRB: CPT | Performed by: FAMILY MEDICINE

## 2024-11-16 PROCEDURE — 96367 TX/PROPH/DG ADDL SEQ IV INF: CPT

## 2024-11-16 PROCEDURE — 84145 PROCALCITONIN (PCT): CPT | Performed by: FAMILY MEDICINE

## 2024-11-16 PROCEDURE — 80048 BASIC METABOLIC PNL TOTAL CA: CPT | Performed by: FAMILY MEDICINE

## 2024-11-16 PROCEDURE — 83605 ASSAY OF LACTIC ACID: CPT | Performed by: FAMILY MEDICINE

## 2024-11-16 PROCEDURE — 87186 SC STD MICRODIL/AGAR DIL: CPT | Performed by: FAMILY MEDICINE

## 2024-11-16 PROCEDURE — 82962 GLUCOSE BLOOD TEST: CPT

## 2024-11-16 PROCEDURE — 96365 THER/PROPH/DIAG IV INF INIT: CPT

## 2024-11-16 PROCEDURE — 85025 COMPLETE CBC W/AUTO DIFF WBC: CPT | Performed by: FAMILY MEDICINE

## 2024-11-16 PROCEDURE — 83735 ASSAY OF MAGNESIUM: CPT | Performed by: FAMILY MEDICINE

## 2024-11-16 PROCEDURE — 87086 URINE CULTURE/COLONY COUNT: CPT | Performed by: FAMILY MEDICINE

## 2024-11-16 PROCEDURE — 76770 US EXAM ABDO BACK WALL COMP: CPT

## 2024-11-16 PROCEDURE — 250N000011 HC RX IP 250 OP 636: Performed by: FAMILY MEDICINE

## 2024-11-16 PROCEDURE — 99223 1ST HOSP IP/OBS HIGH 75: CPT | Performed by: INTERNAL MEDICINE

## 2024-11-16 PROCEDURE — 120N000001 HC R&B MED SURG/OB

## 2024-11-16 PROCEDURE — 87149 DNA/RNA DIRECT PROBE: CPT | Performed by: FAMILY MEDICINE

## 2024-11-16 RX ORDER — CEFTRIAXONE 1 G/1
1 INJECTION, POWDER, FOR SOLUTION INTRAMUSCULAR; INTRAVENOUS ONCE
Status: COMPLETED | OUTPATIENT
Start: 2024-11-16 | End: 2024-11-16

## 2024-11-16 RX ORDER — METFORMIN HYDROCHLORIDE 500 MG/1
1000 TABLET, EXTENDED RELEASE ORAL 2 TIMES DAILY WITH MEALS
COMMUNITY
Start: 2024-02-20

## 2024-11-16 RX ORDER — FLUTICASONE PROPIONATE 50 MCG
1 SPRAY, SUSPENSION (ML) NASAL DAILY
COMMUNITY
End: 2024-11-16

## 2024-11-16 RX ORDER — LIDOCAINE 40 MG/G
CREAM TOPICAL
Status: DISCONTINUED | OUTPATIENT
Start: 2024-11-16 | End: 2024-11-21 | Stop reason: HOSPADM

## 2024-11-16 RX ORDER — ONDANSETRON 2 MG/ML
4 INJECTION INTRAMUSCULAR; INTRAVENOUS ONCE
Status: COMPLETED | OUTPATIENT
Start: 2024-11-16 | End: 2024-11-16

## 2024-11-16 RX ORDER — MAGNESIUM SULFATE HEPTAHYDRATE 40 MG/ML
2 INJECTION, SOLUTION INTRAVENOUS ONCE
Status: COMPLETED | OUTPATIENT
Start: 2024-11-16 | End: 2024-11-16

## 2024-11-16 RX ORDER — ONDANSETRON 4 MG/1
4 TABLET, ORALLY DISINTEGRATING ORAL EVERY 6 HOURS PRN
Qty: 20 TABLET | Refills: 0 | Status: SHIPPED | OUTPATIENT
Start: 2024-11-16 | End: 2024-11-19

## 2024-11-16 RX ORDER — ONDANSETRON 4 MG/1
4 TABLET, ORALLY DISINTEGRATING ORAL EVERY 6 HOURS PRN
Status: DISCONTINUED | OUTPATIENT
Start: 2024-11-16 | End: 2024-11-21 | Stop reason: HOSPADM

## 2024-11-16 RX ORDER — ACETAMINOPHEN 650 MG/1
650 SUPPOSITORY RECTAL EVERY 4 HOURS PRN
Status: DISCONTINUED | OUTPATIENT
Start: 2024-11-16 | End: 2024-11-17

## 2024-11-16 RX ORDER — IBUPROFEN 800 MG/1
800 TABLET, FILM COATED ORAL EVERY 8 HOURS PRN
Status: ON HOLD | COMMUNITY
Start: 2024-11-13 | End: 2024-11-19

## 2024-11-16 RX ORDER — ACETAMINOPHEN 325 MG/1
650 TABLET ORAL EVERY 4 HOURS PRN
Status: DISCONTINUED | OUTPATIENT
Start: 2024-11-16 | End: 2024-11-17

## 2024-11-16 RX ORDER — ACETAMINOPHEN 325 MG/1
650 TABLET ORAL ONCE
Status: COMPLETED | OUTPATIENT
Start: 2024-11-16 | End: 2024-11-16

## 2024-11-16 RX ORDER — AMOXICILLIN 250 MG
1 CAPSULE ORAL 2 TIMES DAILY PRN
Status: DISCONTINUED | OUTPATIENT
Start: 2024-11-16 | End: 2024-11-21 | Stop reason: HOSPADM

## 2024-11-16 RX ORDER — CALCIUM CARBONATE 500 MG/1
1000 TABLET, CHEWABLE ORAL 4 TIMES DAILY PRN
Status: DISCONTINUED | OUTPATIENT
Start: 2024-11-16 | End: 2024-11-21 | Stop reason: HOSPADM

## 2024-11-16 RX ORDER — HYDROCODONE BITARTRATE AND ACETAMINOPHEN 5; 325 MG/1; MG/1
1-2 TABLET ORAL EVERY 6 HOURS PRN
COMMUNITY
Start: 2024-11-13

## 2024-11-16 RX ORDER — CEFTRIAXONE 1 G/1
1 INJECTION, POWDER, FOR SOLUTION INTRAMUSCULAR; INTRAVENOUS EVERY 24 HOURS
Status: DISCONTINUED | OUTPATIENT
Start: 2024-11-17 | End: 2024-11-17

## 2024-11-16 RX ORDER — DEXTROSE MONOHYDRATE 25 G/50ML
25-50 INJECTION, SOLUTION INTRAVENOUS
Status: DISCONTINUED | OUTPATIENT
Start: 2024-11-16 | End: 2024-11-21 | Stop reason: HOSPADM

## 2024-11-16 RX ORDER — ACETAMINOPHEN 500 MG
1000 TABLET ORAL EVERY 6 HOURS PRN
COMMUNITY

## 2024-11-16 RX ORDER — ONDANSETRON 2 MG/ML
4 INJECTION INTRAMUSCULAR; INTRAVENOUS EVERY 6 HOURS PRN
Status: DISCONTINUED | OUTPATIENT
Start: 2024-11-16 | End: 2024-11-21 | Stop reason: HOSPADM

## 2024-11-16 RX ORDER — NICOTINE POLACRILEX 4 MG
15-30 LOZENGE BUCCAL
Status: DISCONTINUED | OUTPATIENT
Start: 2024-11-16 | End: 2024-11-21 | Stop reason: HOSPADM

## 2024-11-16 RX ORDER — AMOXICILLIN 250 MG
2 CAPSULE ORAL 2 TIMES DAILY PRN
Status: DISCONTINUED | OUTPATIENT
Start: 2024-11-16 | End: 2024-11-21 | Stop reason: HOSPADM

## 2024-11-16 RX ORDER — IBUPROFEN 600 MG/1
600 TABLET, FILM COATED ORAL ONCE
Status: COMPLETED | OUTPATIENT
Start: 2024-11-16 | End: 2024-11-16

## 2024-11-16 RX ORDER — CEFDINIR 300 MG/1
300 CAPSULE ORAL 2 TIMES DAILY
Qty: 14 CAPSULE | Refills: 0 | Status: SHIPPED | OUTPATIENT
Start: 2024-11-16 | End: 2024-11-19

## 2024-11-16 RX ADMIN — CEFTRIAXONE 1 G: 1 INJECTION, POWDER, FOR SOLUTION INTRAMUSCULAR; INTRAVENOUS at 19:02

## 2024-11-16 RX ADMIN — SODIUM CHLORIDE 1000 ML: 9 INJECTION, SOLUTION INTRAVENOUS at 19:04

## 2024-11-16 RX ADMIN — ONDANSETRON 4 MG: 2 INJECTION, SOLUTION INTRAMUSCULAR; INTRAVENOUS at 21:11

## 2024-11-16 RX ADMIN — MAGNESIUM SULFATE HEPTAHYDRATE 2 G: 40 INJECTION, SOLUTION INTRAVENOUS at 19:38

## 2024-11-16 RX ADMIN — SODIUM CHLORIDE 1000 ML: 9 INJECTION, SOLUTION INTRAVENOUS at 20:47

## 2024-11-16 ASSESSMENT — ACTIVITIES OF DAILY LIVING (ADL)
ADLS_ACUITY_SCORE: 0

## 2024-11-16 ASSESSMENT — COLUMBIA-SUICIDE SEVERITY RATING SCALE - C-SSRS
2. HAVE YOU ACTUALLY HAD ANY THOUGHTS OF KILLING YOURSELF IN THE PAST MONTH?: NO
6. HAVE YOU EVER DONE ANYTHING, STARTED TO DO ANYTHING, OR PREPARED TO DO ANYTHING TO END YOUR LIFE?: NO
1. IN THE PAST MONTH, HAVE YOU WISHED YOU WERE DEAD OR WISHED YOU COULD GO TO SLEEP AND NOT WAKE UP?: NO

## 2024-11-16 NOTE — ED TRIAGE NOTES
Pt c/o burning with urination since 11/11. She developed fevers and chills around 0200. The pt is concerned that she might have a kidney infection. Last took tylenol and ibuprofen at about 1600     Triage Assessment (Adult)       Row Name 11/16/24 1650          Triage Assessment    Airway WDL WDL        Respiratory WDL    Respiratory WDL WDL        Skin Circulation/Temperature WDL    Skin Circulation/Temperature WDL WDL        Cardiac WDL    Cardiac WDL X;rhythm     Pulse Rate & Regularity tachycardic        Peripheral/Neurovascular WDL    Peripheral Neurovascular WDL WDL        Cognitive/Neuro/Behavioral WDL    Cognitive/Neuro/Behavioral WDL WDL

## 2024-11-16 NOTE — ED PROVIDER NOTES
EMERGENCY DEPARTMENT ENCOUNTER      NAME: Chicho ONTIVEROS Her  AGE: 59 year old female  YOB: 1965  MRN: [id number]  EVALUATION DATE & TIME: No admission date for patient encounter.    PCP: Bony Mancilla    ED PROVIDER: Dejuan Robbins M.D.    Chief Complaint   Patient presents with    Fever    UTI       FINAL IMPRESSION:  1. Pyelonephritis    2. Hypomagnesemia    3. Sepsis without acute organ dysfunction, due to unspecified organism (H)        ED COURSE & MEDICAL DECISION MAKING:    Pertinent Labs & Imaging studies independently interpreted by me. (See chart for details)  Reviewed most recent office visit from yesterday when patient was seen for easy bruising and B12 deficiency, received a B12 shot at that time but it does not look like she was seen by a provider.  Also reviewed most recent office visit from November 8 when patient was seen with bleeding and easy bruising, at that time found to be afebrile but with creatinine 0.79, CRP 6.3, white blood cell count 14.9.    ED Course as of 11/16/24 2109   Sat Nov 16, 2024   1711 Patient seen and examined from the [facility] due to critical capacity in department, presents with 6 days of urinary symptoms, now about 16 hours of fever and chills.  Nausea but no vomiting.  Has dysuria and decreased urine output with sensation of bladder fullness.  No flank pain.  No diarrhea.  On exam here, patient is febrile and tachycardic, no CVA tenderness, no abdominal tenderness.  Labs are ordered along with ibuprofen and will monitor closely.   1824 Labs independently interpreted by me with urinalysis consistent with infection, Rocephin IV is ordered.   1853 Labs ordered and independently interpreted by me with white blood cell count 12.7, lactate elevated at 2.4.  IV fluids ordered along with antibiotics.   1932 Updated patient with findings and plan.  Repeat lactate will be done after fluids and will recheck, vitally stable and lactate improved, patient to be discharged.   2038  Lactate has increased in spite of fluids, patient will be admitted, additional fluids ordered.   2056 Updated patient and family with diagnosis and concerns, patient is agreeable to admission.   2107 Discussed with Dr. Tiwari for admission.         At the conclusion of the encounter I discussed the results of all of the tests and the disposition. The questions were answered. The patient or family acknowledged understanding and was agreeable with the care plan.     Medical Decision Making  Obtained supplemental history:Supplemental history obtained?: Family Member/Significant Other  Reviewed external records: External records reviewed?: Documented in chart  Care impacted by chronic illness:Hypertension  Did you consider but not order tests?: Work up considered but not performed and documented in chart, if applicable  Did you interpret images independently?: Independent interpretation of ECG and images noted in documentation, when applicable.  Consultation discussion with other provider:Did you involve another provider (consultant, , pharmacy, etc.)?: I discussed the care with another health care provider, see documentation for details.  Admit.    MIPS: Not Applicable    PROCEDURES:       MEDICATIONS GIVEN IN THE EMERGENCY:  Medications   sodium chloride 0.9% BOLUS 1,000 mL (1,000 mLs Intravenous $New Bag 11/16/24 2047)   ondansetron (ZOFRAN) injection 4 mg (has no administration in time range)   acetaminophen (TYLENOL) tablet 650 mg (650 mg Oral Not Given 11/16/24 1842)   ibuprofen (ADVIL/MOTRIN) tablet 600 mg (600 mg Oral Not Given 11/16/24 1843)   cefTRIAXone (ROCEPHIN) 1 g vial to attach to  mL bag for ADULTS or NS 50 mL bag for PEDS (0 g Intravenous Stopped 11/16/24 1938)   sodium chloride 0.9% BOLUS 1,000 mL (0 mLs Intravenous Stopped 11/16/24 2039)   magnesium sulfate 2 g in 50 mL sterile water intermittent infusion (0 g Intravenous Stopped 11/16/24 2039)       NEW PRESCRIPTIONS STARTED AT TODAY'S ER  VISIT  New Prescriptions    CEFDINIR (OMNICEF) 300 MG CAPSULE    Take 1 capsule (300 mg) by mouth 2 times daily.    ONDANSETRON (ZOFRAN ODT) 4 MG ODT TAB    Take 1 tablet (4 mg) by mouth every 6 hours as needed for nausea.       =================================================================    HPI    Patient information was obtained from: Patient, family      Chicho ONTIVEROS Her is a 59 year old female who presents today with concern for urinary tract infection.  Patient notes dysuria and sensation of bladder fullness with decreased urine output for the last 6 days, overnight developed shaking chills, subjective fever.  No flank pain, no diarrhea, no vomiting did have some nausea.  Denies chest pain, cough, shortness of breath, runny nose.    REVIEW OF SYSTEMS   Review of Systems   All other systems reviewed and negative    PAST MEDICAL HISTORY:  History reviewed. No pertinent past medical history.    PAST SURGICAL HISTORY:  History reviewed. No pertinent surgical history.    CURRENT MEDICATIONS:    Current Facility-Administered Medications   Medication Dose Route Frequency Provider Last Rate Last Admin    ondansetron (ZOFRAN) injection 4 mg  4 mg Intravenous Once Dejuan Robbins MD        sodium chloride 0.9% BOLUS 1,000 mL  1,000 mL Intravenous Once Dejuan Robbins MD 1,000 mL/hr at 11/16/24 2047 1,000 mL at 11/16/24 2047     Current Outpatient Medications   Medication Sig Dispense Refill    cefdinir (OMNICEF) 300 MG capsule Take 1 capsule (300 mg) by mouth 2 times daily. 14 capsule 0    ondansetron (ZOFRAN ODT) 4 MG ODT tab Take 1 tablet (4 mg) by mouth every 6 hours as needed for nausea. 20 tablet 0    loperamide (IMODIUM A-D) 2 MG tablet Take 1 tablet (2 mg) by mouth 4 times daily as needed for diarrhea 10 tablet 0    loperamide (IMODIUM) 2 mg capsule [LOPERAMIDE (IMODIUM) 2 MG CAPSULE] Take 1 capsule (2 mg total) by mouth 4 (four) times a day as needed for diarrhea. 12 capsule 0    ondansetron (ZOFRAN-ODT) 4  MG disintegrating tablet [ONDANSETRON (ZOFRAN-ODT) 4 MG DISINTEGRATING TABLET] Take 1 tablet (4 mg total) by mouth every 8 (eight) hours as needed. 20 tablet 0       ALLERGIES:  No Known Allergies    FAMILY HISTORY:  Family History   Problem Relation Age of Onset    No Known Problems Mother     No Known Problems Father     No Known Problems Sister     No Known Problems Daughter     No Known Problems Maternal Grandmother     No Known Problems Maternal Grandfather     No Known Problems Paternal Grandmother     No Known Problems Paternal Grandfather     No Known Problems Maternal Aunt     No Known Problems Paternal Aunt     Hereditary Breast and Ovarian Cancer Syndrome No family hx of     Breast Cancer No family hx of     Cancer No family hx of     Colon Cancer No family hx of     Endometrial Cancer No family hx of     Ovarian Cancer No family hx of        SOCIAL HISTORY:   Social History     Socioeconomic History    Marital status:      Spouse name: None    Number of children: None    Years of education: None    Highest education level: None     Social Drivers of Health     Financial Resource Strain: Low Risk  (11/9/2022)    [facility]    Financial Resource Strain     Difficulty of Paying Living Expenses: 3   Food Insecurity: No Food Insecurity (11/9/2022)    Received from [facility], [facility]    Food Insecurity     Worried About Running Out of Food in the Last Year: 1   Transportation Needs: No Transportation Needs (11/9/2022)    Received [facility], [facility]    Transportation Needs     Lack of Transportation (Medical): 1    [facility], [facility]    Social Connections   Housing Stability: Low  "Risk  (11/9/2022)    [facility]    Housing Stability     Unable to Pay for Housing in the Last Year: 1       VITALS:  /59   Pulse 98   Temp 98.5  F (36.9  C) (Oral)   Resp 20   Ht 1.499 m (4' 11\")   Wt 72.6 kg (160 lb)   SpO2 96%   BMI 32.32 kg/m      PHYSICAL EXAM:  Physical Exam  Vitals and nursing note reviewed.   Constitutional:       Appearance: Normal appearance.   HENT:      Head: Normocephalic and atraumatic.      Right Ear: External ear normal.      Left Ear: External ear normal.      Nose: Nose normal.      Mouth/Throat:      Mouth: Mucous membranes are moist.   Eyes:      Extraocular Movements: Extraocular movements intact.      Conjunctiva/sclera: Conjunctivae normal.      Pupils: Pupils are equal, round, and reactive to light.   Cardiovascular:      Rate and Rhythm: Regular rhythm. Tachycardia present.   Pulmonary:      Effort: Pulmonary effort is normal.      Breath sounds: Normal breath sounds. No wheezing or rales.   Abdominal:      General: Abdomen is flat. There is no distension.      Palpations: Abdomen is soft.      Tenderness: There is no abdominal tenderness. There is no guarding.   Musculoskeletal:         General: Normal range of motion.      Cervical back: Normal range of motion and neck supple.      Right lower leg: No edema.      Left lower leg: No edema.   Lymphadenopathy:      Cervical: No cervical adenopathy.   Skin:     General: Skin is warm and dry.   Neurological:      General: No focal deficit present.      Mental Status: She is alert and oriented to person, place, and time. Mental status is at baseline.      Comments: No gross focal neurologic deficits   Psychiatric:         Mood and Affect: Mood normal.         Behavior: Behavior normal.         Thought Content: Thought content normal.          LAB:  All pertinent labs reviewed and interpreted.  Results for orders placed or " performed during the hospital encounter of 11/16/24   UA with Microscopic reflex to Culture    Specimen: Urine, Clean Catch   Result Value Ref Range    Color Urine Yellow Colorless, Straw, Light Yellow, Yellow    Appearance Urine Turbid (A) Clear    Glucose Urine Negative Negative mg/dL    Bilirubin Urine Negative Negative    Ketones Urine Negative Negative mg/dL    Specific Gravity Urine 1.020 1.001 - 1.030    Blood Urine 0.03 mg/dL (A) Negative    pH Urine 6.5 5.0 - 7.0    Protein Albumin Urine 20 (A) Negative mg/dL    Urobilinogen Urine <2.0 <2.0 mg/dL    Nitrite Urine Negative Negative    Leukocyte Esterase Urine 500 Isai/uL (A) Negative    Mucus Urine Present (A) None Seen /LPF    RBC Urine 5 (H) <=2 /HPF    WBC Urine >182 (H) <=5 /HPF    Squamous Epithelials Urine 13 (H) <=1 /HPF   Basic metabolic panel   Result Value Ref Range    Sodium 139 135 - 145 mmol/L    Potassium 3.6 3.4 - 5.3 mmol/L    Chloride 101 98 - 107 mmol/L    Carbon Dioxide (CO2) 22 22 - 29 mmol/L    Anion Gap 16 (H) 7 - 15 mmol/L    Urea Nitrogen 10.8 6.0 - 20.0 mg/dL    Creatinine 0.96 (H) 0.51 - 0.95 mg/dL    GFR Estimate 68 >60 mL/min/1.73m2    Calcium 8.5 (L) 8.8 - 10.4 mg/dL    Glucose 96 70 - 99 mg/dL   Lactic acid whole blood with 1x repeat in 2 hr when >2   Result Value Ref Range    Lactic Acid, Initial 2.4 (H) 0.7 - 2.0 mmol/L   Result Value Ref Range    Procalcitonin 1.02 (H) <0.50 ng/mL   Result Value Ref Range    Magnesium 1.5 (L) 1.7 - 2.3 mg/dL   Influenza A/B, RSV, & SARS-CoV2 PCR (COVID-19) Nose    Specimen: Nose; Swab   Result Value Ref Range    Influenza A PCR Negative Negative    Influenza B PCR Negative Negative    RSV PCR Negative Negative    SARS CoV2 PCR Negative Negative   CBC with platelets and differential   Result Value Ref Range    WBC Count 12.7 (H) 4.0 - 11.0 10e3/uL    RBC Count 4.76 3.80 - 5.20 10e6/uL    Hemoglobin 13.0 11.7 - 15.7 g/dL    Hematocrit 40.1 35.0 - 47.0 %    MCV 84 78 - 100 fL    MCH 27.3 26.5 -  33.0 pg    MCHC 32.4 31.5 - 36.5 g/dL    RDW 13.2 10.0 - 15.0 %    Platelet Count 226 150 - 450 10e3/uL    % Neutrophils 93 %    % Lymphocytes 4 %    % Monocytes 2 %    % Eosinophils 1 %    % Basophils 0 %    % Immature Granulocytes 1 %    NRBCs per 100 WBC 0 <1 /100    Absolute Neutrophils 11.8 (H) 1.6 - 8.3 10e3/uL    Absolute Lymphocytes 0.5 (L) 0.8 - 5.3 10e3/uL    Absolute Monocytes 0.2 0.0 - 1.3 10e3/uL    Absolute Eosinophils 0.1 0.0 - 0.7 10e3/uL    Absolute Basophils 0.0 0.0 - 0.2 10e3/uL    Absolute Immature Granulocytes 0.1 <=0.4 10e3/uL    Absolute NRBCs 0.0 10e3/uL   Lactic acid whole blood   Result Value Ref Range    Lactic Acid 3.2 (H) 0.7 - 2.0 mmol/L       RADIOLOGY:  Reviewed all pertinent imaging. Please see official radiology report.  No orders to display     Dejuan Robbins M.D.  Emergency Medicine  [facility] EMERGENCY ROOM  [address] [facility] [id number] [phone]  Dept: [phone]       Dejuan Robbins MD  11/16/24 4924

## 2024-11-17 LAB
ACINETOBACTER SPECIES: NOT DETECTED
ALBUMIN SERPL BCG-MCNC: 3.3 G/DL (ref 3.5–5.2)
ALP SERPL-CCNC: 92 U/L (ref 40–150)
ALT SERPL W P-5'-P-CCNC: 13 U/L (ref 0–50)
ANION GAP SERPL CALCULATED.3IONS-SCNC: 11 MMOL/L (ref 7–15)
AST SERPL W P-5'-P-CCNC: 18 U/L (ref 0–45)
BACTERIA UR CULT: NORMAL
BASOPHILS # BLD AUTO: 0 10E3/UL (ref 0–0.2)
BASOPHILS NFR BLD AUTO: 0 %
BILIRUB SERPL-MCNC: 0.3 MG/DL
BUN SERPL-MCNC: 9.2 MG/DL (ref 6–20)
CALCIUM SERPL-MCNC: 8.7 MG/DL (ref 8.8–10.4)
CHLORIDE SERPL-SCNC: 108 MMOL/L (ref 98–107)
CITROBACTER SPECIES: NOT DETECTED
CREAT SERPL-MCNC: 0.87 MG/DL (ref 0.51–0.95)
CTX-M: DETECTED
EGFRCR SERPLBLD CKD-EPI 2021: 77 ML/MIN/1.73M2
ENTEROBACTER SPECIES: NOT DETECTED
EOSINOPHIL # BLD AUTO: 0.1 10E3/UL (ref 0–0.7)
EOSINOPHIL NFR BLD AUTO: 1 %
ERYTHROCYTE [DISTWIDTH] IN BLOOD BY AUTOMATED COUNT: 13.5 % (ref 10–15)
ESCHERICHIA COLI: DETECTED
GLUCOSE BLDC GLUCOMTR-MCNC: 102 MG/DL (ref 70–99)
GLUCOSE BLDC GLUCOMTR-MCNC: 102 MG/DL (ref 70–99)
GLUCOSE BLDC GLUCOMTR-MCNC: 108 MG/DL (ref 70–99)
GLUCOSE BLDC GLUCOMTR-MCNC: 110 MG/DL (ref 70–99)
GLUCOSE BLDC GLUCOMTR-MCNC: 133 MG/DL (ref 70–99)
GLUCOSE SERPL-MCNC: 116 MG/DL (ref 70–99)
HCO3 SERPL-SCNC: 22 MMOL/L (ref 22–29)
HCT VFR BLD AUTO: 37.9 % (ref 35–47)
HGB BLD-MCNC: 11.5 G/DL (ref 11.7–15.7)
IMM GRANULOCYTES # BLD: 0.1 10E3/UL
IMM GRANULOCYTES NFR BLD: 0 %
IMP: NOT DETECTED
KLEBSIELLA OXYTOCA: NOT DETECTED
KLEBSIELLA PNEUMONIAE: NOT DETECTED
KPC: NOT DETECTED
LYMPHOCYTES # BLD AUTO: 0.5 10E3/UL (ref 0.8–5.3)
LYMPHOCYTES NFR BLD AUTO: 4 %
MAGNESIUM SERPL-MCNC: 2.5 MG/DL (ref 1.7–2.3)
MAGNESIUM SERPL-MCNC: 2.5 MG/DL (ref 1.7–2.3)
MCH RBC QN AUTO: 26.6 PG (ref 26.5–33)
MCHC RBC AUTO-ENTMCNC: 30.3 G/DL (ref 31.5–36.5)
MCV RBC AUTO: 88 FL (ref 78–100)
MONOCYTES # BLD AUTO: 1 10E3/UL (ref 0–1.3)
MONOCYTES NFR BLD AUTO: 7 %
NDM: NOT DETECTED
NEUTROPHILS # BLD AUTO: 12.7 10E3/UL (ref 1.6–8.3)
NEUTROPHILS NFR BLD AUTO: 88 %
NRBC # BLD AUTO: 0 10E3/UL
NRBC BLD AUTO-RTO: 0 /100
OXA (DETECTED/NOT DETECTED): NOT DETECTED
PLATELET # BLD AUTO: 175 10E3/UL (ref 150–450)
POTASSIUM SERPL-SCNC: 4.6 MMOL/L (ref 3.4–5.3)
PROT SERPL-MCNC: 6.2 G/DL (ref 6.4–8.3)
PROTEUS SPECIES: NOT DETECTED
PSEUDOMONAS AERUGINOSA: NOT DETECTED
RBC # BLD AUTO: 4.32 10E6/UL (ref 3.8–5.2)
SODIUM SERPL-SCNC: 141 MMOL/L (ref 135–145)
VIM: NOT DETECTED
WBC # BLD AUTO: 14.4 10E3/UL (ref 4–11)

## 2024-11-17 PROCEDURE — 83735 ASSAY OF MAGNESIUM: CPT | Performed by: STUDENT IN AN ORGANIZED HEALTH CARE EDUCATION/TRAINING PROGRAM

## 2024-11-17 PROCEDURE — 82040 ASSAY OF SERUM ALBUMIN: CPT | Performed by: INTERNAL MEDICINE

## 2024-11-17 PROCEDURE — 83735 ASSAY OF MAGNESIUM: CPT | Performed by: INTERNAL MEDICINE

## 2024-11-17 PROCEDURE — 258N000003 HC RX IP 258 OP 636: Performed by: INTERNAL MEDICINE

## 2024-11-17 PROCEDURE — 82247 BILIRUBIN TOTAL: CPT | Performed by: INTERNAL MEDICINE

## 2024-11-17 PROCEDURE — 250N000013 HC RX MED GY IP 250 OP 250 PS 637: Performed by: STUDENT IN AN ORGANIZED HEALTH CARE EDUCATION/TRAINING PROGRAM

## 2024-11-17 PROCEDURE — 120N000001 HC R&B MED SURG/OB

## 2024-11-17 PROCEDURE — 85004 AUTOMATED DIFF WBC COUNT: CPT | Performed by: INTERNAL MEDICINE

## 2024-11-17 PROCEDURE — 36415 COLL VENOUS BLD VENIPUNCTURE: CPT | Performed by: INTERNAL MEDICINE

## 2024-11-17 PROCEDURE — 85018 HEMOGLOBIN: CPT | Performed by: INTERNAL MEDICINE

## 2024-11-17 PROCEDURE — 250N000011 HC RX IP 250 OP 636: Performed by: STUDENT IN AN ORGANIZED HEALTH CARE EDUCATION/TRAINING PROGRAM

## 2024-11-17 PROCEDURE — 99222 1ST HOSP IP/OBS MODERATE 55: CPT | Performed by: INTERNAL MEDICINE

## 2024-11-17 PROCEDURE — 250N000013 HC RX MED GY IP 250 OP 250 PS 637: Performed by: INTERNAL MEDICINE

## 2024-11-17 PROCEDURE — 99233 SBSQ HOSP IP/OBS HIGH 50: CPT | Performed by: STUDENT IN AN ORGANIZED HEALTH CARE EDUCATION/TRAINING PROGRAM

## 2024-11-17 PROCEDURE — 80053 COMPREHEN METABOLIC PANEL: CPT | Performed by: INTERNAL MEDICINE

## 2024-11-17 PROCEDURE — 85049 AUTOMATED PLATELET COUNT: CPT | Performed by: INTERNAL MEDICINE

## 2024-11-17 RX ORDER — ACETAMINOPHEN 500 MG
1000 TABLET ORAL EVERY 6 HOURS PRN
Status: DISCONTINUED | OUTPATIENT
Start: 2024-11-17 | End: 2024-11-21 | Stop reason: HOSPADM

## 2024-11-17 RX ORDER — PHENAZOPYRIDINE HYDROCHLORIDE 100 MG/1
100 TABLET, FILM COATED ORAL
Status: DISCONTINUED | OUTPATIENT
Start: 2024-11-17 | End: 2024-11-17

## 2024-11-17 RX ORDER — PHENAZOPYRIDINE HYDROCHLORIDE 100 MG/1
100 TABLET, FILM COATED ORAL
Status: DISCONTINUED | OUTPATIENT
Start: 2024-11-17 | End: 2024-11-21 | Stop reason: HOSPADM

## 2024-11-17 RX ORDER — MEROPENEM 1 G/1
1 INJECTION, POWDER, FOR SOLUTION INTRAVENOUS EVERY 8 HOURS
Status: DISCONTINUED | OUTPATIENT
Start: 2024-11-17 | End: 2024-11-19

## 2024-11-17 RX ADMIN — ACETAMINOPHEN 650 MG: 325 TABLET ORAL at 09:00

## 2024-11-17 RX ADMIN — MEROPENEM 1 G: 1 INJECTION, POWDER, FOR SOLUTION INTRAVENOUS at 21:16

## 2024-11-17 RX ADMIN — SODIUM CHLORIDE 300 ML: 9 INJECTION, SOLUTION INTRAVENOUS at 01:05

## 2024-11-17 RX ADMIN — MEROPENEM 1 G: 1 INJECTION, POWDER, FOR SOLUTION INTRAVENOUS at 11:26

## 2024-11-17 RX ADMIN — PHENAZOPYRIDINE 100 MG: 100 TABLET ORAL at 17:54

## 2024-11-17 NOTE — PLAN OF CARE
Problem: UTI (Urinary Tract Infection)  Goal: Improved Infection Symptoms  Outcome: Progressing     Problem: Fever  Goal: Body Temperature in Desired Range  Outcome: Progressing   Goal Outcome Evaluation:    Pt A&Ox4, VSS at this time, on RA. Pt denies pain. Ambulating to bathroom independently, assistance x1 with IV pole when receiving bolus. Pt denies urinary frequency, urgency, and dysuria overnight. On K and Mg protocols. ACHS. Bed in lowest position, call light within reach, pt calls appropriately.

## 2024-11-17 NOTE — PROGRESS NOTES
Pt took 1000 mg of Tylenol of her own supply at 1700. Informed her she can't have or self administer her own medications here. She understood and family said they will take it home with them. MD aware.

## 2024-11-17 NOTE — H&P
"[facility] MEDICINE ADMISSION HISTORY AND PHYSICAL       Assessment & Plan      This is a 59 year  woman with urosepsis       Present on Admission (POA)    1. UTI - sepsis concerns, lactic acid rising to3+, WBC of 12K, HR above 90s    - continue IV rocephin, follow cultures  - Renal US  - CBC/CMP    2. Mag of 1.5  - replace per protocol         Chronic Medical Conditions    1. DM2  2. 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    Weight as of this encounter: 72.6 kg (160 lb).       # Asthma: noted on problem list        [age]Y -- Renal US --- Several small left renal cysts; the 1 cm cyst in the left mid kidney contains a small echogenic calcified focus. Since this represents an interval change from May 2022, consider retroperitoneal ultrasound in 6 months.     Defer to AM doc to include in discharge summary to have patient follow up        VTE prophylaxis --  SCDs, if appropriate, add SQH  Diet --  DM2   Code Status -- Full  Barriers to discharge -- Admitting/Acute medical condition/s  Discharge Disposition and goals --  Unable to determine at this point, pending progress/treatment response.     PPE - I was wearing PPE including but not limited to - N95 mask, Gloves, and/or Safety glasses.  Admission Status --  Inpatient     Care plan is based on available information and patient's condition at encounter. Care plan was discussed and agreed to proceed by the patient/family member. Made aware that care plan may change.     I recommend to review/revise history/care plan for information/results not available during my encounter. All " or some home medication/s were not resumed or was modified on admission due to safety concerns. Will have rounding AM doc  review safety to resume held/modified home medications.     Availability -- If need to coordinate care after night shift  -- Contact assigned AM rounding Hospitalist.     75 minutes spent by me on the date of service doing chart review, history, exam, diagnostic test results interpretation, care coordination with RN, RT and/or Pharm, & further activities per the note.      Zach Tiwari MD, MPH, FACP, [facility]  Internal Medicine - Hospitalist        Chief Complaint Fever      HISTORY     - Patient was seen in ED - 18. Can speak and understand - English. Met her family too.    - Shaking chills and fever since last night. Persisted all  day, took tylenol. Also reported some back flank discomfort and dysuria. No vaginal discharge. No history of recurrent UTI.     - ED course/treatments/referral - Lactic acid to 3+, WBC of 12K, was given IVF and rocephin      - ROS --- No headache. No dizziness. No weakness. No CP or SOB. No palpitations. No abdominal pain. No nausea or vomiting. No bleeding symptoms. No weight loss. Rest of 12 point ROS was reviewed and negative.       Past Medical History     History reviewed. No pertinent past medical history.      Surgical History     History reviewed. No pertinent surgical history.     Family History      Family History   Problem Relation Age of Onset    No Known Problems Mother     No Known Problems Father     No Known Problems Sister     No Known Problems Daughter     No Known Problems Maternal Grandmother     No Known Problems Maternal Grandfather     No Known Problems Paternal Grandmother     No Known Problems Paternal Grandfather     No Known Problems Maternal Aunt     No Known Problems Paternal Aunt     Hereditary Breast and Ovarian Cancer Syndrome No family hx of     Breast Cancer No family hx of     Cancer No family hx of     Colon Cancer No family hx of      Endometrial Cancer No family hx of     Ovarian Cancer No family hx of          Social History      .  Social History     Socioeconomic History    Marital status:      Spouse name: Not on file    Number of children: Not on file    Years of education: Not on file    Highest education level: Not on file   Occupational History    Not on file   Tobacco Use    Smoking status: Not on file    Smokeless tobacco: Not on file   Substance and Sexual Activity    Alcohol use: Not on file    Drug use: Not on file    Sexual activity: Not on file   Other Topics Concern    Not on file   Social History Narrative    Not on file     Social Drivers of Health     Financial Resource Strain: Low Risk  (11/9/2022)    [facility]    Financial Resource Strain     Difficulty of Paying Living Expenses: 3     Difficulty of Paying Living Expenses: Not on file   Food Insecurity: No Food Insecurity (11/9/2022)    [facility], [facility] [employer] [facility]    Food Insecurity     Worried About Running Out of Food in the Last Year: 1   Transportation Needs: No Transportation Needs (11/9/2022)    [facility], [facility]    Transportation Needs     Lack of Transportation (Medical): 1   Physical Activity: Not on file   Stress: Not on file   Social Connections: Unknown (11/10/2023)    [facility], [facility]    Social Connections     Frequency of Communication with Friends and Family: Not on file   Interpersonal Safety: Not on file   Housing Stability: Low Risk  (11/9/2022)    [facility], [facility] [facility] [employer] [facility]    Housing Stability     Unable to Pay for Housing in  "the Last Year: 1          Allergies      No Known Allergies      Prior to Admission Medications      Current Facility-Administered Medications   Medication Dose Route Frequency Provider Last Rate Last Admin    ondansetron (ZOFRAN) injection 4 mg  4 mg Intravenous Once Dejuan Robbins MD        sodium chloride 0.9% BOLUS 1,000 mL  1,000 mL Intravenous Once Dejuan Robbins MD 1,000 mL/hr at 11/16/24 2047 1,000 mL at 11/16/24 2047     Current Outpatient Medications   Medication Sig Dispense Refill    cefdinir (OMNICEF) 300 MG capsule Take 1 capsule (300 mg) by mouth 2 times daily. 14 capsule 0    ondansetron (ZOFRAN ODT) 4 MG ODT tab Take 1 tablet (4 mg) by mouth every 6 hours as needed for nausea. 20 tablet 0    loperamide (IMODIUM A-D) 2 MG tablet Take 1 tablet (2 mg) by mouth 4 times daily as needed for diarrhea 10 tablet 0    loperamide (IMODIUM) 2 mg capsule [LOPERAMIDE (IMODIUM) 2 MG CAPSULE] Take 1 capsule (2 mg total) by mouth 4 (four) times a day as needed for diarrhea. 12 capsule 0    ondansetron (ZOFRAN-ODT) 4 MG disintegrating tablet [ONDANSETRON (ZOFRAN-ODT) 4 MG DISINTEGRATING TABLET] Take 1 tablet (4 mg total) by mouth every 8 (eight) hours as needed. 20 tablet 0           Review of Systems     A 12 point comprehensive review of systems was negative except as noted above in HPI.    PHYSICAL EXAMINATION       Vitals      Vitals: /59   Pulse 98   Temp 98.5  F (36.9  C) (Oral)   Resp 20   Ht 1.499 m (4' 11\")   Wt 72.6 kg (160 lb)   SpO2 96%   BMI 32.32 kg/m    BMI= Body mass index is 32.32 kg/m .      Examination     General Appearance:  Alert, cooperative, no distress  Head:    Normocephalic, without obvious abnormality, atraumatic  EENT:  PERRL, conjunctiva/corneas clear, EOM's intact.   Neck:   Supple, symmetrical, trachea midline, no adenopathy; no NVE  Back:  Symmetric, no curvature, no CVA tenderness  Chest/Lungs: Clear to auscultation bilaterally, respirations unlabored, No " tenderness or deformity. No abdominal breathing or use of accessory muscles.   Heart:    Regular rate and rhythm, S1 and S2 normal, no murmur, rub   or gallop  Abdomen: Soft, non-tender, bowel sounds active all four quadrants, not peritoneal on palpation. Not distended  Extremities:  Extremities normal, atraumatic, no swelling   Skin:  Skin color, texture, turgor normal, no rashes or lesion  Neurologic:  Awake and alert, No lateralizing or localizing signs            Pertinent Lab     Results for orders placed or performed during the hospital encounter of 11/16/24   UA with Microscopic reflex to Culture    Specimen: Urine, Clean Catch   Result Value Ref Range    Color Urine Yellow Colorless, Straw, Light Yellow, Yellow    Appearance Urine Turbid (A) Clear    Glucose Urine Negative Negative mg/dL    Bilirubin Urine Negative Negative    Ketones Urine Negative Negative mg/dL    Specific Gravity Urine 1.020 1.001 - 1.030    Blood Urine 0.03 mg/dL (A) Negative    pH Urine 6.5 5.0 - 7.0    Protein Albumin Urine 20 (A) Negative mg/dL    Urobilinogen Urine <2.0 <2.0 mg/dL    Nitrite Urine Negative Negative    Leukocyte Esterase Urine 500 Isai/uL (A) Negative    Mucus Urine Present (A) None Seen /LPF    RBC Urine 5 (H) <=2 /HPF    WBC Urine >182 (H) <=5 /HPF    Squamous Epithelials Urine 13 (H) <=1 /HPF   Basic metabolic panel   Result Value Ref Range    Sodium 139 135 - 145 mmol/L    Potassium 3.6 3.4 - 5.3 mmol/L    Chloride 101 98 - 107 mmol/L    Carbon Dioxide (CO2) 22 22 - 29 mmol/L    Anion Gap 16 (H) 7 - 15 mmol/L    Urea Nitrogen 10.8 6.0 - 20.0 mg/dL    Creatinine 0.96 (H) 0.51 - 0.95 mg/dL    GFR Estimate 68 >60 mL/min/1.73m2    Calcium 8.5 (L) 8.8 - 10.4 mg/dL    Glucose 96 70 - 99 mg/dL   Lactic acid whole blood with 1x repeat in 2 hr when >2   Result Value Ref Range    Lactic Acid, Initial 2.4 (H) 0.7 - 2.0 mmol/L   Result Value Ref Range    Procalcitonin 1.02 (H) <0.50 ng/mL   Result Value Ref Range    Magnesium  1.5 (L) 1.7 - 2.3 mg/dL   Influenza A/B, RSV, & SARS-CoV2 PCR (COVID-19) Nose    Specimen: Nose; Swab   Result Value Ref Range    Influenza A PCR Negative Negative    Influenza B PCR Negative Negative    RSV PCR Negative Negative    SARS CoV2 PCR Negative Negative   CBC with platelets and differential   Result Value Ref Range    WBC Count 12.7 (H) 4.0 - 11.0 10e3/uL    RBC Count 4.76 3.80 - 5.20 10e6/uL    Hemoglobin 13.0 11.7 - 15.7 g/dL    Hematocrit 40.1 35.0 - 47.0 %    MCV 84 78 - 100 fL    MCH 27.3 26.5 - 33.0 pg    MCHC 32.4 31.5 - 36.5 g/dL    RDW 13.2 10.0 - 15.0 %    Platelet Count 226 150 - 450 10e3/uL    % Neutrophils 93 %    % Lymphocytes 4 %    % Monocytes 2 %    % Eosinophils 1 %    % Basophils 0 %    % Immature Granulocytes 1 %    NRBCs per 100 WBC 0 <1 /100    Absolute Neutrophils 11.8 (H) 1.6 - 8.3 10e3/uL    Absolute Lymphocytes 0.5 (L) 0.8 - 5.3 10e3/uL    Absolute Monocytes 0.2 0.0 - 1.3 10e3/uL    Absolute Eosinophils 0.1 0.0 - 0.7 10e3/uL    Absolute Basophils 0.0 0.0 - 0.2 10e3/uL    Absolute Immature Granulocytes 0.1 <=0.4 10e3/uL    Absolute NRBCs 0.0 10e3/uL   Lactic acid whole blood   Result Value Ref Range    Lactic Acid 3.2 (H) 0.7 - 2.0 mmol/L           Pertinent Radiology

## 2024-11-17 NOTE — PHARMACY-ADMISSION MEDICATION HISTORY
Pharmacist Admission Medication History    Admission medication history is complete. The information provided in this note is only as accurate as the sources available at the time of the update.    Information Source(s): Patient and CareEverywhere/SureScripts via in-person    Pertinent Information: none    Changes made to PTA medication list:  Added: metformin XR, ibuprofen, Norco, acetaminophen  Deleted: Flonase, loperamide, ondansetron ODT  Changed: None    Allergies reviewed with patient and updates made in EHR: yes    Medication History Completed By: Marilyn Mann RPH 11/16/2024 9:32 PM    PTA Med List   Medication Sig Note Last Dose/Taking    acetaminophen (TYLENOL) 500 MG tablet Take 1,000 mg by mouth every 6 hours as needed for mild pain.  11/16/2024 at  4:00 PM    cefdinir (OMNICEF) 300 MG capsule Take 1 capsule (300 mg) by mouth 2 times daily.  Taking    HYDROcodone-acetaminophen (NORCO) 5-325 MG tablet Take 1-2 tablets by mouth every 6 hours as needed for pain. 11/16/2024: For tooth extraction, has not taken.  Unknown    ibuprofen (ADVIL/MOTRIN) 800 MG tablet Take 800 mg by mouth every 8 hours as needed for moderate pain. 11/16/2024: For tooth extraction, has been taking ibuprofen and acetaminophen. 11/16/2024 at  2:00 PM    metFORMIN (GLUCOPHAGE XR) 500 MG 24 hr tablet Take 1,000 mg by mouth 2 times daily (with meals).  11/16/2024 at 11:00 AM    ondansetron (ZOFRAN ODT) 4 MG ODT tab Take 1 tablet (4 mg) by mouth every 6 hours as needed for nausea.  Taking As Needed

## 2024-11-17 NOTE — PROGRESS NOTES
Writer took a critical lab call from Alona ramirez: blood culture collected on 11/16/2024 at 6:52pm came back positive for gram negative bacilli on first day of incubation 11/17/2024. Primary RN Kathrine SANCHEZ, and Dr. Castro (Hospitaltist) have memo informed. -Cecy BROWN RN

## 2024-11-17 NOTE — ED NOTES
Introduced self to patient and family. Whiteboard updated. Bed is locked and in low position. Updated the pt with the plan of care. Call light within reach.

## 2024-11-17 NOTE — PROGRESS NOTES
"[facility]    Medicine Progress Note - Hospitalist Service    Date of Admission:  11/16/2024    Assessment & Plan   51-year-old female past med history significant for type 2 diabetes and asthma presented to [facility] with dysuria subsequently found to have a UTI on day 1 of admission also positive for gram-negative bacilli, Verigene positive for E. coli and CTX-M gene indicating ESBL.      Sepsis  UTI  Bacteremia-presumed urinary source, was started on ceftriaxone in the ED however today on 11/17 urging positive for E. coli as well as the CTX-M gene was switched to meropenem.  WBC slightly increased today(11/7) likely due to inadequate antibiotic coverage.   -Meropenem  -Await further culture data  -ID consulted appreciate recs  -Pyridium for urinary pain    T2DM-blood sugars have been reasonable  -Hold PTA metformin  -Medium dose sliding scale insulin    Hx of asthma-Not on any medications PTA, no respiratory concerns.        Diet: Moderate Consistent Carb (60 g CHO per Meal) Diet    DVT Prophylaxis: Pneumatic Compression Devices, low Padua score  Lane Catheter: Not present  Lines: None     Cardiac Monitoring: None  Code Status: Full Code      Clinically Significant Risk Factors Present on Admission          # Hyperchloremia: Highest Cl = 108 mmol/L in last 2 days, will monitor as appropriate      # Hypocalcemia: Lowest Ca = 8.5 mg/dL in last 2 days, will monitor and replace as appropriate   # Hypomagnesemia: Lowest Mg = 1.5 mg/dL in last 2 days, will replace as needed   # Hypoalbuminemia: Lowest albumin = 3.3 g/dL at 11/17/2024  6:05 AM, will monitor as appropriate               # Obesity: Estimated body mass index is 33.12 kg/m  as calculated from the following:    Height as of this encounter: 1.499 m (4' 11\").    Weight as of this encounter: 74.4 kg (164 lb).       # Asthma: noted on problem list        Social Drivers of Health     Received from [employer] [facility]    Social Connections          Disposition Plan     Medically Ready for Discharge: Anticipated in 2-4 Days             Emery Castro MD  Hospitalist Service  [facility]  Securely message with [employer] (more info)  Text page via [employer] Paging/Directory   ______________________________________________________________________    Interval History   No acute events overnight, today patient reports she still getting.he had some fatigue she is possibly feeling slightly better than yesterday but reports more so feels like the same as yesterday.  Discusses frequent dysuria agreeable to trial of Pyridium educated on urinary color changes.    Physical Exam   Vital Signs: Temp: 98.4  F (36.9  C) Temp src: Oral BP: 135/74 Pulse: 84   Resp: 17 SpO2: 93 % O2 Device: None (Room air)    Weight: 164 lbs 0 oz    Gen: Appears well, NAD, on RA  Card:Warm well perfused, pulses assumed patient talking  Pulm: Normal I/E effort on RA  Abd:Non distended  Skin:No obvious rashes or lesions on exposed areas of skin  Neuro AxOx4, S/S grossly intact, no obvious FND,   Psych:Pleasant, answering questions appropriately, insight good, judgement good, does not appear to be responding to I/E stimuli     Medical Decision Making       60 MINUTES SPENT BY ME on the date of service doing chart review, history, exam, documentation & further activities per the note.      Data   ------------------------- PAST 24 HR DATA REVIEWED -----------------------------------------------    I have personally reviewed the following data over the past 24 hrs:    14.4 (H)  \   11.5 (L)   / 175     141 108 (H) 9.2 /  108 (H)   4.6 22 0.87 \     ALT: 13 AST: 18 AP: 92 TBILI: 0.3   ALB: 3.3 (L) TOT PROTEIN: 6.2 (L) LIPASE: N/A     Procal: 1.02 (H) CRP: N/A Lactic Acid: 3.2 (H)         Imaging results reviewed over the past 24 hrs:   Recent Results (from the past 24 hours)   US Renal Complete  Non-Vascular    Narrative    EXAM: US RENAL COMPLETE NON-VASCULAR  LOCATION: [facility]  DATE: 11/16/2024    INDICATION: sepsis, UTI, check for stones, renal abscess  COMPARISON: CT abdomen and pelvis from 05/09/2022  TECHNIQUE: Routine Bilateral Renal and Bladder Ultrasound.    FINDINGS:    RIGHT KIDNEY: Measures 10 x 5 x 5 cm. Normal without hydronephrosis or masses.     LEFT KIDNEY:   *  Measures 11 x 4 x 5 cm.   *  Simple cyst in the left MID kidney measuring approximately 10 mm with small echogenic focus 7 mm focus. While the cyst was present in 2022, the echogenic focus is new.  *  Two closely abutting simple cysts in the left LOWER POLE kidney measure a combined 16 mm in long axis, unchanged from May 2022. No follow-up is necessary.    BLADDER: Urinary bladder volume of 201 mL. No post void volume is measured.      Impression    IMPRESSION:  *  Several small left renal cysts; the 1 cm cyst in the left mid kidney contains a small echogenic calcified focus. Since this represents an interval change from May 2022, consider retroperitoneal ultrasound in 6 months. Overall this is felt to represent   a low likelihood of clinical significance, however.  *  No hydronephrosis.

## 2024-11-17 NOTE — CONSULTS
Consultation - INFECTIOUS DISEASE CONSULTATION  Chicho Jin ,  1965, MRN [id number]      Hypomagnesemia [E83.42]  Pyelonephritis [N12]  Sepsis without acute organ dysfunction, due to unspecified organism (H) [A41.9]    PCP: Bony Mancilla, [phone]   Code status:  Full Code               Assessment:  ESBL E coli bacteremia: admission BC positive, ESBL genotype on verigene. Switched to meropenem.  DM2    Active Problems:    Hypomagnesemia    Pyelonephritis    Sepsis without acute organ dysfunction, due to unspecified organism (H)      Recommendations:   - continue meropenem IV  - follow susceptibilities  - typically limited PO treatment options may be stuck with course of IV ertapenem  - follow fever curve, wbc. If no improvement despite appropriate antibiotic therapy, may need CT imaging    Barbara Vuong MD  [facility]  Office Telephone [phone].  Fax [phone]  [employer] paging      HPI:    Chicho Jin is a 59 year old female. History is provided by patient and chart.  Presented on  with a week of dysuria then 1-2 days of progressively worsening fevers and chills with rigors. BC with esbl e coli so switched to meropenem a few hours ago. Does not feel better since admission. Tolerating antibiotics. Not much appetite.     Chief complaint: Active Problems:    Hypomagnesemia    Pyelonephritis    Sepsis without acute organ dysfunction, due to unspecified organism (H)      Medical History  Active Ambulatory Problems     Diagnosis Date Noted    Vomiting and diarrhea 2022    Asthma 2022    Fibromyalgia 2016    Prediabetes 2015     Resolved Ambulatory Problems     Diagnosis Date Noted    No Resolved Ambulatory Problems     No Additional Past Medical History         Surgical History  She  has no past surgical history on file.       Social History  Reviewed, and she          Family History  Reviewed and noncontributory to present problem, and family history  includes No Known Problems in her daughter, father, maternal aunt, maternal grandfather, maternal grandmother, mother, paternal aunt, paternal grandfather, paternal grandmother, and sister.    Psychosocial Needs  Social History     Social History Narrative    Not on file     Additional psychosocial needs reviewed per nursing assessment.       No Known Allergies   Medications Prior to Admission   Medication Sig Dispense Refill Last Dose/Taking    acetaminophen (TYLENOL) 500 MG tablet Take 1,000 mg by mouth every 6 hours as needed for mild pain.   11/16/2024 at  4:00 PM    HYDROcodone-acetaminophen (NORCO) 5-325 MG tablet Take 1-2 tablets by mouth every 6 hours as needed for pain.   Unknown    ibuprofen (ADVIL/MOTRIN) 800 MG tablet Take 800 mg by mouth every 8 hours as needed for moderate pain.   11/16/2024 at  2:00 PM    metFORMIN (GLUCOPHAGE XR) 500 MG 24 hr tablet Take 1,000 mg by mouth 2 times daily (with meals).   11/16/2024 at 11:00 AM        Review of Systems:  A 12 point comprehensive review of systems was negative except as noted. Physical Exam:  Temp:  [98  F (36.7  C)-100.6  F (38.1  C)] 98.4  F (36.9  C)  Pulse:  [] 84  Resp:  [16-20] 17  BP: (106-156)/(52-80) 135/74  SpO2:  [92 %-98 %] 93 %    GEN: alert and oriented x3, NAD  HEAD: atraumatic  ENT: moist membranes, no thrush, anicteric sclera   NECK: supple, no nuchal rigidity  CARDIOVASCULAR: regular rate and rhythm, no murmurs, rubs, or gallops  PULMONARY: lungs clear to ausculation bilaterally  ABDOMEN: soft, nontender, nondistended. Normal bowel sounds. No CVA tenderness  SKIN: no rashes or lesions. No stigma of endocarditis  PSYCH: grossly intact  MUSCULOSKELETAL: no synovitis               Pertinent Labs  personally reviewed.   CBC RESULTS:   Recent Labs   Lab Test 11/17/24  0604   WBC 14.4*   RBC 4.32   HGB 11.5*   HCT 37.9   MCV 88   MCH 26.6   MCHC 30.3*   RDW 13.5           Last Comprehensive Metabolic Panel:  Sodium   Date Value  "Ref Range Status   11/17/2024 141 135 - 145 mmol/L Final     Potassium   Date Value Ref Range Status   11/17/2024 4.6 3.4 - 5.3 mmol/L Final   05/09/2022 3.6 3.5 - 5.0 mmol/L Final     Chloride   Date Value Ref Range Status   11/17/2024 108 (H) 98 - 107 mmol/L Final   05/09/2022 108 (H) 98 - 107 mmol/L Final     Carbon Dioxide (CO2)   Date Value Ref Range Status   11/17/2024 22 22 - 29 mmol/L Final   05/09/2022 21 (L) 22 - 31 mmol/L Final     Anion Gap   Date Value Ref Range Status   11/17/2024 11 7 - 15 mmol/L Final   05/09/2022 11 5 - 18 mmol/L Final     Glucose   Date Value Ref Range Status   11/17/2024 116 (H) 70 - 99 mg/dL Final   05/09/2022 157 (H) 70 - 125 mg/dL Final     GLUCOSE BY METER POCT   Date Value Ref Range Status   11/17/2024 108 (H) 70 - 99 mg/dL Final     Urea Nitrogen   Date Value Ref Range Status   11/17/2024 9.2 6.0 - 20.0 mg/dL Final   05/09/2022 14 8 - 22 mg/dL Final     Creatinine   Date Value Ref Range Status   11/17/2024 0.87 0.51 - 0.95 mg/dL Final     GFR Estimate   Date Value Ref Range Status   11/17/2024 77 >60 mL/min/1.73m2 Final     Comment:     eGFR calculated using 2021 CKD-EPI equation.   02/01/2020 >60 >60 mL/min/1.73m2 Final     Calcium   Date Value Ref Range Status   11/17/2024 8.7 (L) 8.8 - 10.4 mg/dL Final     Comment:     Reference intervals for this test were updated on 7/16/2024 to reflect our healthy population more accurately. There may be differences in the flagging of prior results with similar values performed with this method. Those prior results can be interpreted in the context of the updated reference intervals.       No results found for: \"CRP\"     The following microbiology studies were personally reviewed:  No results found for: \"CULT\"    Urine Studies    Recent Labs   Lab Test 11/16/24  1720   LEUKEST 500 Isai/uL*   WBCU >182*       Vancomycin Levels  No lab results found.    Invalid input(s): \"VANCO\"    MICROBIOLOGY DATA:    All cultures:  7-Day Micro Results  "      Collected Updated Procedure Result Status      11/16/2024 1852 11/17/2024 0828 Blood Culture Peripheral Blood [[id number]]   (Abnormal)   Peripheral Blood    Preliminary result Component Value   Culture Positive on the 1st day of incubation  [P]     Gram negative bacilli  [P]     1 of 2 bottles               11/16/2024 1852 11/17/2024 1036 Verigene GN Panel [[id number]]    (Abnormal)   Peripheral Blood    Final result Component Value   Acinetobacter species Not Detected   Citrobacter species Not Detected   Enterobacter species Not Detected   Proteus species Not Detected   Escherichia coli Detected   Positive for Escherichia coli by Verigene multiplex nucleic acid test. Final identification and antimicrobial susceptibility testing will be verified by standard methods. Verigene test will not distinguish E. coli from Shigella species including Shigella dysenteriae, Shigella flexneri, Shigella boydii, and Shigella sonnei. Specimens containing Shigella species or E. coli will be reported as positive for E. coli.   Klebsiella pneumoniae Not Detected   Klebsiella oxytoca Not Detected   Pseudomonas aeruginosa Not Detected   CTX-M Detected   Positive for CTX-M Class A Extended Spectrum beta-lactamase (ESBL) resistance marker by Verigene multiplex nucleic acid test. CTX-M confers resistance to penicillins, cephalosporins and variable resistance to beta-lactamase inhibitor combinations.  Best empiric antibiotic choice is meropenem. Specific susceptibility testing will be performed.   KPC Not Detected   NDM Not Detected   VIM Not Detected   IMP Not Detected   OXA Not Detected            11/16/2024 1804 11/16/2024 1908 Influenza A/B, RSV, & SARS-CoV2 PCR (COVID-19) Nose [[id number]]    Swab from Nose    Final result Component Value   Influenza A PCR Negative   Influenza B PCR Negative   RSV PCR Negative   SARS CoV2 PCR Negative   NEGATIVE: SARS-CoV-2 (COVID-19) RNA not detected, presumed negative.             11/16/2024 1720 11/17/2024 1128 Urine Culture [[id number]]   Urine, Clean Catch    Final result Component Value   Culture 10,000-50,000 CFU/mL Mixture of urogenital steff                        Pertinent Radiology  personally reviewed.     US Renal Complete Non-Vascular    Result Date: 11/16/2024  EXAM: US RENAL COMPLETE NON-VASCULAR LOCATION: [facility] DATE: 11/16/2024 INDICATION: sepsis, UTI, check for stones, renal abscess COMPARISON: CT abdomen and pelvis from 05/09/2022 TECHNIQUE: Routine Bilateral Renal and Bladder Ultrasound. FINDINGS: RIGHT KIDNEY: Measures 10 x 5 x 5 cm. Normal without hydronephrosis or masses. LEFT KIDNEY: *  Measures 11 x 4 x 5 cm. *  Simple cyst in the left MID kidney measuring approximately 10 mm with small echogenic focus 7 mm focus. While the cyst was present in 2022, the echogenic focus is new. *  Two closely abutting simple cysts in the left LOWER POLE kidney measure a combined 16 mm in long axis, unchanged from May 2022. No follow-up is necessary. BLADDER: Urinary bladder volume of 201 mL. No post void volume is measured.     IMPRESSION: *  Several small left renal cysts; the 1 cm cyst in the left mid kidney contains a small echogenic calcified focus. Since this represents an interval change from May 2022, consider retroperitoneal ultrasound in 6 months. Overall this is felt to represent  a low likelihood of clinical significance, however. *  No hydronephrosis.

## 2024-11-18 ENCOUNTER — ENROLLMENT (OUTPATIENT)
Dept: HOME HEALTH SERVICES | Facility: HOME HEALTH | Age: 59
End: 2024-11-18
Payer: COMMERCIAL

## 2024-11-18 ENCOUNTER — HOME INFUSION (OUTPATIENT)
Dept: HOME HEALTH SERVICES | Facility: HOME HEALTH | Age: 59
End: 2024-11-18
Payer: COMMERCIAL

## 2024-11-18 DIAGNOSIS — N12 PYELONEPHRITIS: Primary | ICD-10-CM

## 2024-11-18 LAB
ANION GAP SERPL CALCULATED.3IONS-SCNC: 12 MMOL/L (ref 7–15)
BUN SERPL-MCNC: 7.7 MG/DL (ref 6–20)
CALCIUM SERPL-MCNC: 8.8 MG/DL (ref 8.8–10.4)
CHLORIDE SERPL-SCNC: 106 MMOL/L (ref 98–107)
CREAT SERPL-MCNC: 0.79 MG/DL (ref 0.51–0.95)
EGFRCR SERPLBLD CKD-EPI 2021: 86 ML/MIN/1.73M2
ERYTHROCYTE [DISTWIDTH] IN BLOOD BY AUTOMATED COUNT: 13.2 % (ref 10–15)
GLUCOSE BLDC GLUCOMTR-MCNC: 108 MG/DL (ref 70–99)
GLUCOSE BLDC GLUCOMTR-MCNC: 117 MG/DL (ref 70–99)
GLUCOSE BLDC GLUCOMTR-MCNC: 151 MG/DL (ref 70–99)
GLUCOSE BLDC GLUCOMTR-MCNC: 270 MG/DL (ref 70–99)
GLUCOSE BLDC GLUCOMTR-MCNC: 96 MG/DL (ref 70–99)
GLUCOSE SERPL-MCNC: 104 MG/DL (ref 70–99)
HCO3 SERPL-SCNC: 23 MMOL/L (ref 22–29)
HCT VFR BLD AUTO: 36.9 % (ref 35–47)
HGB BLD-MCNC: 11.6 G/DL (ref 11.7–15.7)
MAGNESIUM SERPL-MCNC: 2.1 MG/DL (ref 1.7–2.3)
MCH RBC QN AUTO: 27 PG (ref 26.5–33)
MCHC RBC AUTO-ENTMCNC: 31.4 G/DL (ref 31.5–36.5)
MCV RBC AUTO: 86 FL (ref 78–100)
PLATELET # BLD AUTO: 192 10E3/UL (ref 150–450)
POTASSIUM SERPL-SCNC: 4 MMOL/L (ref 3.4–5.3)
RBC # BLD AUTO: 4.29 10E6/UL (ref 3.8–5.2)
SODIUM SERPL-SCNC: 141 MMOL/L (ref 135–145)
WBC # BLD AUTO: 8.1 10E3/UL (ref 4–11)

## 2024-11-18 PROCEDURE — 250N000011 HC RX IP 250 OP 636: Performed by: STUDENT IN AN ORGANIZED HEALTH CARE EDUCATION/TRAINING PROGRAM

## 2024-11-18 PROCEDURE — 36415 COLL VENOUS BLD VENIPUNCTURE: CPT | Performed by: STUDENT IN AN ORGANIZED HEALTH CARE EDUCATION/TRAINING PROGRAM

## 2024-11-18 PROCEDURE — 85018 HEMOGLOBIN: CPT | Performed by: STUDENT IN AN ORGANIZED HEALTH CARE EDUCATION/TRAINING PROGRAM

## 2024-11-18 PROCEDURE — 250N000013 HC RX MED GY IP 250 OP 250 PS 637: Performed by: STUDENT IN AN ORGANIZED HEALTH CARE EDUCATION/TRAINING PROGRAM

## 2024-11-18 PROCEDURE — 80048 BASIC METABOLIC PNL TOTAL CA: CPT | Performed by: STUDENT IN AN ORGANIZED HEALTH CARE EDUCATION/TRAINING PROGRAM

## 2024-11-18 PROCEDURE — 85041 AUTOMATED RBC COUNT: CPT | Performed by: STUDENT IN AN ORGANIZED HEALTH CARE EDUCATION/TRAINING PROGRAM

## 2024-11-18 PROCEDURE — 99233 SBSQ HOSP IP/OBS HIGH 50: CPT | Performed by: STUDENT IN AN ORGANIZED HEALTH CARE EDUCATION/TRAINING PROGRAM

## 2024-11-18 PROCEDURE — 99232 SBSQ HOSP IP/OBS MODERATE 35: CPT | Performed by: STUDENT IN AN ORGANIZED HEALTH CARE EDUCATION/TRAINING PROGRAM

## 2024-11-18 PROCEDURE — 120N000001 HC R&B MED SURG/OB

## 2024-11-18 PROCEDURE — 83735 ASSAY OF MAGNESIUM: CPT | Performed by: STUDENT IN AN ORGANIZED HEALTH CARE EDUCATION/TRAINING PROGRAM

## 2024-11-18 RX ADMIN — MEROPENEM 1 G: 1 INJECTION, POWDER, FOR SOLUTION INTRAVENOUS at 05:25

## 2024-11-18 RX ADMIN — ACETAMINOPHEN 1000 MG: 500 TABLET ORAL at 12:13

## 2024-11-18 RX ADMIN — PHENAZOPYRIDINE 100 MG: 100 TABLET ORAL at 13:18

## 2024-11-18 RX ADMIN — PHENAZOPYRIDINE 100 MG: 100 TABLET ORAL at 09:13

## 2024-11-18 RX ADMIN — MEROPENEM 1 G: 1 INJECTION, POWDER, FOR SOLUTION INTRAVENOUS at 21:17

## 2024-11-18 RX ADMIN — ACETAMINOPHEN 1000 MG: 500 TABLET ORAL at 20:47

## 2024-11-18 RX ADMIN — MEROPENEM 1 G: 1 INJECTION, POWDER, FOR SOLUTION INTRAVENOUS at 13:18

## 2024-11-18 RX ADMIN — PHENAZOPYRIDINE 100 MG: 100 TABLET ORAL at 18:08

## 2024-11-18 NOTE — PLAN OF CARE
Goal Outcome Evaluation:      Plan of Care Reviewed With: patient    Overall Patient Progress: improvingOverall Patient Progress: improving    Outcome Evaluation: Anticipate discharge home, potential IV abx needs    Kelton Carter RN CM

## 2024-11-18 NOTE — PROGRESS NOTES
"[facility] ID Inpatient follow up       Patient:  Chicho ONTIVEROS Her  Date of birth 1965, Medical record number [id number]  Date of Visit:  2024  Attending Physician: Emery Castro MD         Assessment and Recommendations:   Assessment:  Chicho ONTIVEROS Her is a 59 year old female with   ESBL E. coli bacteremia.  Source is urinary given symptomatic UTI on admission.  Urine culture no growth.  Feels better on IV meropenem    Recommendations:  Continue IV meropenem for now pending susceptibility.  She will likely need IV ertapenem outpatient  Hoping strain will be susceptible to oral options.  If not then IV as above.    Discussed with the patient, nursing staff.    ID will follow    Keturah Figueroa MD.  [facility].   [facility]  Office Telephone [phone].  Fax [phone]  [facility] paging            Interval History:     HPI:  The interval history was reviewed.   New to me today.  Report feeling better.  On meropenem.  Had a week of dysuria prior to admission.  CT scan reviewed.    Pertinent cultures include:  No results found for: \"CULT\"    Recent Inflammatory Biomarkers:   Recent Labs   Lab Test 24  0628 24  0604 24  1824 24  1921 23  1658 22  1547   PCAL  --   --  1.02*  --   --   --    WBC 8.1 14.4* 12.7* 9.5 8.9 9.3            Review of Systems:   CONSTITUTIONAL:    Temp Max: Temp (24hrs), Av.4  F (36.9  C), Min:98.3  F (36.8  C), Max:98.6  F (37  C)   .  Negative except for findings in the HPI.           Current Medications (antimicrobials listed in bold):     Current Facility-Administered Medications   Medication Dose Route Frequency Provider Last Rate Last Admin    insulin aspart (NovoLOG) injection (RAPID ACTING)  1-7 Units Subcutaneous TID AC Jennifer Tiwari MD        insulin aspart (NovoLOG) injection (RAPID ACTING)  1-5 Units Subcutaneous At Bedtime Jennifer Tiwari MD        " meropenem (MERREM) 1 g vial to attach to  mL bag  1 g Intravenous Q8H Emery Castro MD   1 g at 11/18/24 1318    phenazopyridine (PYRIDIUM) tablet 100 mg  100 mg Oral TID w/meals Emery Castro MD   100 mg at 11/18/24 1318    sodium chloride (PF) 0.9% PF flush 3 mL  3 mL Intracatheter Q8H Jennifer Tiwari MD   3 mL at 11/18/24 1323              Allergies:   No Known Allergies         Physical Exam:   Vitals were reviewed  Patient Vitals for the past 24 hrs:   BP Temp Temp src Pulse Resp SpO2 Weight   11/18/24 1538 119/79 98.4  F (36.9  C) Oral 84 16 93 % --   11/18/24 1100 124/72 98.6  F (37  C) Oral 85 16 94 % --   11/18/24 0605 -- -- -- -- -- -- 72.7 kg (160 lb 4.8 oz)   11/18/24 0055 (!) 153/72 98.3  F (36.8  C) Oral 83 16 92 % --       Physical Examination:  Gen: Pleasant in no acute distress.  HEENT: NCAT. EOMI. PERRL.  Neck: No bruit, JVD or thyromegaly.  Lungs: Clear to ascultation bilat with no crackles or wheezes.  Card: RRR. NSR. No RMG. Peripheral pulses present and symmetric. No edema.  Abd: Soft NT ND. No mass. Normal bowel sounds.  Skin: No rash.  Extr: No edema.  Neuro: Alert and oriented to place time and person. Cranial nerves II to XII intact. Motor and sensory intact. Normal gait.            Laboratory Data:   ID Labs:  Microbiology labs:  ESBL E. coli    No lab results found.  Recent Labs   Lab Test 11/18/24  0628 11/17/24  0604 11/16/24 1824 06/05/24  1921 09/13/23  1658 05/09/22  1547   WBC 8.1 14.4* 12.7* 9.5 8.9 9.3     Recent Labs   Lab Test 11/18/24  0628 11/17/24  0605 11/16/24  1824 06/05/24  1921   CR 0.79 0.87 0.96* 0.90   GFRESTIMATED 86 77 68 74       Hematology Studies  Recent Labs   Lab Test 11/18/24  0628 11/17/24  0604 11/16/24  1824 06/05/24  1921 09/13/23  1658 05/09/22  1547   WBC 8.1 14.4* 12.7* 9.5 8.9 9.3   HGB 11.6* 11.5* 13.0 13.9 13.0 15.1   HCT 36.9 37.9 40.1 43.8 41.4 46.8    175 226 243 190 243       Metabolic  Recent Labs   Lab Test 11/18/24 0628  11/17/24  0605 11/16/24  1824    141 139   BUN 7.7 9.2 10.8   CO2 23 22 22   CR 0.79 0.87 0.96*   GFRESTIMATED 86 77 68       Hepatic Studies  Recent Labs   Lab Test 11/17/24  0605 06/05/24  1921 05/09/22  1547   BILITOTAL 0.3 0.2 0.3   ALKPHOS 92 112 107   ALBUMIN 3.3* 3.9 3.3*   AST 18 21 35   ALT 13 21 58*       ImmunologlobulinsNo lab results found.         Imaging Data:   Reviewed

## 2024-11-18 NOTE — PLAN OF CARE
Problem: Adult Inpatient Plan of Care  Goal: Optimal Comfort and Wellbeing  Outcome: Progressing     Problem: UTI (Urinary Tract Infection)  Goal: Improved Infection Symptoms  Outcome: Progressing     Problem: Fever  Goal: Body Temperature in Desired Range  Outcome: Progressing   Goal Outcome Evaluation:    Pt A&Ox4, VSS at this time, on RA. Pt denies pain through shift. On IV abx. ACHS checks. K and Mg protocols. Denies urinary frequency, urgency, or dysuria. Contact precautions maintained. Moves ind in room. Call light within reach, pt calls appropriately.

## 2024-11-18 NOTE — PROGRESS NOTES
[facility]    Referral received  for IV antibiotics.    Benefits verified. Pt has [place] for IV antibiotics under [place] [facility] [place].  Pt has a deductible of $200.00 which has been met.  Pt has 80% coverage until her $3600 has been met. She has met $1208.56 so far.  Once met, her coverage is 100%.    Should pt require IV antibiotics at discharge, writer will speak with pt to review benefits, home infusion and to offer choice of agency/home infusion provider.    Thank you for the referral.    Herlinda King, RN, BSN  [facility] Liaison  [phone] (Mon through Fri, 8:00 am-5:00 pm)  [phone] (Office)

## 2024-11-18 NOTE — PLAN OF CARE
Goal Outcome Evaluation:      Plan of Care Reviewed With: patient    Overall Patient Progress: improvingOverall Patient Progress: improving       VSS. C/o generalized aches. Tylenol helpful. Tolerating moderate carb diet, blood sugars did not need insulin coverage. High blood sugar at dinner time was because pt did not notify staff before. PIV SL'd between abx. Up independently. Voiding, urine orange from pyridium.

## 2024-11-18 NOTE — PROGRESS NOTES
"[facility]    Medicine Progress Note - Hospitalist Service    Date of Admission:  11/16/2024    Assessment & Plan   51-year-old female past med history significant for type 2 diabetes and asthma presented to [facility] with dysuria subsequently found to have a UTI on day 1 of admission also positive for gram-negative bacilli, Verigene positive for E. coli and CTX-M gene indicating ESBL.    Changes today:  -Still pending culture data +Sensitivities, on meropenem  -Leukocytosis now resolved on meropenem  -Dysuria improved on Azo  -Abx per ID    Sepsis  UTI  Bacteremia-presumed urinary source, was started on ceftriaxone in the ED however today on 11/17 urging positive for E. coli as well as the CTX-M gene was switched to meropenem.  WBC slightly increased today(11/7) likely due to inadequate antibiotic coverage.   -Meropenem  -Await further culture data  -ID consulted appreciate recs  -Pyridium for urinary pain    T2DM-blood sugars have been reasonable  -Hold PTA metformin  -Medium dose sliding scale insulin    Hx of asthma-Not on any medications PTA, no respiratory concerns.        Diet: Moderate Consistent Carb (60 g CHO per Meal) Diet    DVT Prophylaxis: Pneumatic Compression Devices, low Padua score  Lane Catheter: Not present  Lines: None     Cardiac Monitoring: None  Code Status: Full Code      Clinically Significant Risk Factors          # Hyperchloremia: Highest Cl = 108 mmol/L in last 2 days, will monitor as appropriate      # Hypocalcemia: Lowest Ca = 8.5 mg/dL in last 2 days, will monitor and replace as appropriate   # Hypomagnesemia: Lowest Mg = 1.5 mg/dL in last 2 days, will replace as needed   # Hypoalbuminemia: Lowest albumin = 3.3 g/dL at 11/17/2024  6:05 AM, will monitor as appropriate                # Obesity: Estimated body mass index is 32.38 kg/m  as calculated from the following:    Height as of this encounter: 1.499 m (4' 11\").    Weight as of this encounter: 72.7 kg " (160 lb 4.8 oz)., PRESENT ON ADMISSION     # Financial/Environmental Concerns: none  # Asthma: noted on problem list        Social Drivers of Health     [facility] [employer] [facility] [employer] [facility]    Social Connections          Disposition Plan     Medically Ready for Discharge: Anticipated in 2-4 Days             Emery Castro MD  Hospitalist Service  [facility]  Securely message with [employer] (more info)  Text page via [employer] Paging/Directory   ______________________________________________________________________    Interval History   NAEO. Reports feeling better today, dysuria has resolved with Azo.     Physical Exam   Vital Signs: Temp: 98.3  F (36.8  C) Temp src: Oral BP: (!) 153/72 Pulse: 83   Resp: 16 SpO2: 92 % O2 Device: None (Room air)    Weight: 160 lbs 4.8 oz    Gen: Appears well, NAD, on RA  Card:Warm well perfused, pulses assumed patient talking  Pulm: Normal I/E effort on RA  Abd:Non distended  Skin:No obvious rashes or lesions on exposed areas of skin  Neuro AxOx4, S/S grossly intact, no obvious FND,   Psych:Pleasant, answering questions appropriately, insight good, judgement good, does not appear to be responding to I/E stimuli     Medical Decision Making       50 MINUTES SPENT BY ME on the date of service doing chart review, history, exam, documentation & further activities per the note.      Data   ------------------------- PAST 24 HR DATA REVIEWED -----------------------------------------------    I have personally reviewed the following data over the past 24 hrs:    8.1  \   11.6 (L)   / 192     141 106 7.7 /  104 (H)   4.0 23 0.79 \       Imaging results reviewed over the past 24 hrs:   No results found for this or any previous visit (from the past 24 hours).

## 2024-11-18 NOTE — PROGRESS NOTES
Pt vitally stable on RA, denies pain. Transferring to 2E unit bed. Report given to unit RN. Transport to accompany pt via wheelchair.    Talita Mahan RN

## 2024-11-18 NOTE — CONSULTS
Care Management Initial Consult    General Information  Assessment completed with: Patient,    Type of CM/SW Visit: Initial Assessment    Primary Care Provider verified and updated as needed: Yes   Readmission within the last 30 days:        Reason for Consult: discharge planning  Advance Care Planning:            Communication Assessment  Patient's communication style: spoken language (English or Bilingual)    Hearing Difficulty or Deaf: no   Wear Glasses or Blind: no    Cognitive  Cognitive/Neuro/Behavioral: WDL  Level of Consciousness: alert  Arousal Level: opens eyes spontaneously  Orientation: oriented x 4  Mood/Behavior: calm, cooperative     Speech: clear, spontaneous, logical    Living Environment:   People in home: spouse, child(massimo), adult     Current living Arrangements: house      Able to return to prior arrangements: yes       Family/Social Support:  Care provided by: self  Provides care for: no one  Marital Status:   Support system:            Description of Support System:           Current Resources:   Patient receiving home care services: No        Community Resources: None  Equipment currently used at home: none  Supplies currently used at home: None    Employment/Financial:  Employment Status: employed full-time        Financial Concerns: none           Does the patient's insurance plan have a 3 day qualifying hospital stay waiver?  No    Lifestyle & Psychosocial Needs:  Social Drivers of Health     Food Insecurity: Low Risk  (11/16/2024)    Food Insecurity     Within the past 12 months, did you worry that your food would run out before you got money to buy more?: No     Within the past 12 months, did the food you bought just not last and you didn t have money to get more?: No   Depression: Not at risk (12/2/2022)    Received from [facility], [facility]    PHQ-2     PHQ-2 TOTAL SCORE: 0   Housing Stability: Low Risk   (11/16/2024)    Housing Stability     Do you have housing? : Yes     Are you worried about losing your housing?: No   Tobacco Use: Low Risk  (11/15/2024)    Received from [facility]    Patient History     Smoking Tobacco Use: Never     Smokeless Tobacco Use: Never     Passive Exposure: Never   Financial Resource Strain: Low Risk  (11/16/2024)    Financial Resource Strain     Within the past 12 months, have you or your family members you live with been unable to get utilities (heat, electricity) when it was really needed?: No   Alcohol Use: Not on file   Transportation Needs: Low Risk  (11/16/2024)    Transportation Needs     Within the past 12 months, has lack of transportation kept you from medical appointments, getting your medicines, non-medical meetings or appointments, work, or from getting things that you need?: No   Physical Activity: Not on file   Interpersonal Safety: Low Risk  (11/16/2024)    Interpersonal Safety     Do you feel physically and emotionally safe where you currently live?: Yes     Within the past 12 months, have you been hit, slapped, kicked or otherwise physically hurt by someone?: No     Within the past 12 months, have you been humiliated or emotionally abused in other ways by your partner or ex-partner?: No   Stress: Not on file   Social Connections: Unknown (11/10/2023)    Received from [employer] [facility], [facility] [employer] [facility]    Social Connections     Frequency of Communication with Friends and Family: Not on file   Health Literacy: Not on file       Functional Status:  Prior to admission patient needed assistance:              Mental Health Status:  Mental Health Status: No Current Concerns       Chemical Dependency Status:  Chemical Dependency Status: No Current Concerns             Values/Beliefs:  Spiritual, Cultural Beliefs, Oriental orthodox Practices, Values that affect care:                 Discussed  Partnership in Safe  Discharge Planning  document with patient/family: No    Additional Information:  Assessed.  Pt lives in a house with her spouse, 2 adult children and their families.  Pt is independent at baseline, without use of DME.  Pt is employed full-time.  Pt hopes to discharge home; family can transport.    ID is following, final abx plan pending.    Pt authorizes referral to  Home Infusion to check benefits for home IV abx.  Should pt require IV abx at discharge, pt feels confident she can manage at home; daughter-in-law is a nurse and can also assist as needed.    [facility] ([facility] )- referral sent.    10:20 AM  I - Per Herlinda, pt has coverage for IV abx.  Pt has a little left to pay out of pocket before she has 100% coverage.  \[facility] [place] [facility]\"" will cover the nursing needs.  See Infusion Services Note when available.    Next Steps:   Follow abx plan.    CM will continue to follow.    Kelton Carter RN

## 2024-11-18 NOTE — PLAN OF CARE
Problem: UTI (Urinary Tract Infection)  Goal: Improved Infection Symptoms  Outcome: Progressing   Goal Outcome Evaluation:       Patient denies any frequency, urgency, or burning with urination. Patient was switched to meropenem as Rocephin was resistant to Bacteria. Patient has positive blood culture, MD aware. Patient placed on contact precautions for ESBL. Family updated.         Problem: Fever  Goal: Body Temperature in Desired Range  Outcome: Progressing   Patient has been afebrile, vitals are stable, tolerating diet.

## 2024-11-19 LAB
BACTERIA BLD CULT: ABNORMAL
BACTERIA BLD CULT: ABNORMAL
GLUCOSE BLDC GLUCOMTR-MCNC: 100 MG/DL (ref 70–99)
GLUCOSE BLDC GLUCOMTR-MCNC: 122 MG/DL (ref 70–99)
GLUCOSE BLDC GLUCOMTR-MCNC: 125 MG/DL (ref 70–99)
GLUCOSE BLDC GLUCOMTR-MCNC: 151 MG/DL (ref 70–99)
GLUCOSE BLDC GLUCOMTR-MCNC: 215 MG/DL (ref 70–99)
HOLD SPECIMEN: NORMAL
MAGNESIUM SERPL-MCNC: 1.9 MG/DL (ref 1.7–2.3)
POTASSIUM SERPL-SCNC: 4.1 MMOL/L (ref 3.4–5.3)

## 2024-11-19 PROCEDURE — 99232 SBSQ HOSP IP/OBS MODERATE 35: CPT | Performed by: STUDENT IN AN ORGANIZED HEALTH CARE EDUCATION/TRAINING PROGRAM

## 2024-11-19 PROCEDURE — 250N000011 HC RX IP 250 OP 636: Performed by: STUDENT IN AN ORGANIZED HEALTH CARE EDUCATION/TRAINING PROGRAM

## 2024-11-19 PROCEDURE — 84132 ASSAY OF SERUM POTASSIUM: CPT | Performed by: STUDENT IN AN ORGANIZED HEALTH CARE EDUCATION/TRAINING PROGRAM

## 2024-11-19 PROCEDURE — 120N000001 HC R&B MED SURG/OB

## 2024-11-19 PROCEDURE — 36415 COLL VENOUS BLD VENIPUNCTURE: CPT | Performed by: STUDENT IN AN ORGANIZED HEALTH CARE EDUCATION/TRAINING PROGRAM

## 2024-11-19 PROCEDURE — 272N000748 HC KIT, CATH 3FR OR 4FR SINGLE LUMEN POWERMIDLINE

## 2024-11-19 PROCEDURE — 250N000011 HC RX IP 250 OP 636: Performed by: EMERGENCY MEDICINE

## 2024-11-19 PROCEDURE — 999N000157 HC STATISTIC RCP TIME EA 10 MIN

## 2024-11-19 PROCEDURE — 250N000011 HC RX IP 250 OP 636: Performed by: INTERNAL MEDICINE

## 2024-11-19 PROCEDURE — 36569 INSJ PICC 5 YR+ W/O IMAGING: CPT

## 2024-11-19 PROCEDURE — 99233 SBSQ HOSP IP/OBS HIGH 50: CPT | Performed by: INTERNAL MEDICINE

## 2024-11-19 PROCEDURE — 250N000012 HC RX MED GY IP 250 OP 636 PS 637: Performed by: INTERNAL MEDICINE

## 2024-11-19 PROCEDURE — 250N000013 HC RX MED GY IP 250 OP 250 PS 637: Performed by: STUDENT IN AN ORGANIZED HEALTH CARE EDUCATION/TRAINING PROGRAM

## 2024-11-19 PROCEDURE — 258N000003 HC RX IP 258 OP 636: Performed by: EMERGENCY MEDICINE

## 2024-11-19 PROCEDURE — 250N000009 HC RX 250: Performed by: INTERNAL MEDICINE

## 2024-11-19 PROCEDURE — 250N000009 HC RX 250: Performed by: EMERGENCY MEDICINE

## 2024-11-19 PROCEDURE — 83735 ASSAY OF MAGNESIUM: CPT | Performed by: STUDENT IN AN ORGANIZED HEALTH CARE EDUCATION/TRAINING PROGRAM

## 2024-11-19 RX ORDER — ERTAPENEM 1 G/1
1 INJECTION, POWDER, LYOPHILIZED, FOR SOLUTION INTRAMUSCULAR; INTRAVENOUS EVERY 24 HOURS
Status: SHIPPED
Start: 2024-11-19 | End: 2024-11-19

## 2024-11-19 RX ORDER — DIPHENHYDRAMINE HYDROCHLORIDE 50 MG/ML
INJECTION INTRAMUSCULAR; INTRAVENOUS
Status: COMPLETED
Start: 2024-11-19 | End: 2024-11-19

## 2024-11-19 RX ORDER — ERTAPENEM 1 G/1
1 INJECTION, POWDER, LYOPHILIZED, FOR SOLUTION INTRAMUSCULAR; INTRAVENOUS EVERY 24 HOURS
Status: DISCONTINUED | OUTPATIENT
Start: 2024-11-19 | End: 2024-11-19

## 2024-11-19 RX ORDER — LIDOCAINE 40 MG/G
CREAM TOPICAL
Status: DISCONTINUED | OUTPATIENT
Start: 2024-11-19 | End: 2024-11-21 | Stop reason: HOSPADM

## 2024-11-19 RX ORDER — DIPHENHYDRAMINE HCL 50 MG
50 CAPSULE ORAL ONCE
Status: COMPLETED | OUTPATIENT
Start: 2024-11-19 | End: 2024-11-19

## 2024-11-19 RX ORDER — METHYLPREDNISOLONE SODIUM SUCCINATE 125 MG/2ML
125 INJECTION INTRAMUSCULAR; INTRAVENOUS ONCE
Status: COMPLETED | OUTPATIENT
Start: 2024-11-19 | End: 2024-11-19

## 2024-11-19 RX ADMIN — PHENAZOPYRIDINE 100 MG: 100 TABLET ORAL at 13:03

## 2024-11-19 RX ADMIN — EPINEPHRINE 0.3 MG: 1 INJECTION INTRAMUSCULAR; INTRAVENOUS; SUBCUTANEOUS at 20:42

## 2024-11-19 RX ADMIN — FAMOTIDINE 20 MG: 10 INJECTION, SOLUTION INTRAVENOUS at 21:35

## 2024-11-19 RX ADMIN — MEROPENEM 1 G: 1 INJECTION, POWDER, FOR SOLUTION INTRAVENOUS at 05:29

## 2024-11-19 RX ADMIN — PHENAZOPYRIDINE 100 MG: 100 TABLET ORAL at 17:50

## 2024-11-19 RX ADMIN — MEROPENEM 1 G: 1 INJECTION, POWDER, FOR SOLUTION INTRAVENOUS at 13:38

## 2024-11-19 RX ADMIN — INSULIN ASPART 1 UNITS: 100 INJECTION, SOLUTION INTRAVENOUS; SUBCUTANEOUS at 21:32

## 2024-11-19 RX ADMIN — PHENAZOPYRIDINE 100 MG: 100 TABLET ORAL at 07:52

## 2024-11-19 RX ADMIN — LIDOCAINE HYDROCHLORIDE 2.5 ML: 10 INJECTION, SOLUTION EPIDURAL; INFILTRATION; INTRACAUDAL; PERINEURAL at 15:25

## 2024-11-19 RX ADMIN — SODIUM CHLORIDE 500 ML: 9 INJECTION, SOLUTION INTRAVENOUS at 20:52

## 2024-11-19 RX ADMIN — DIPHENHYDRAMINE HYDROCHLORIDE 50 MG: 50 INJECTION INTRAMUSCULAR; INTRAVENOUS at 20:34

## 2024-11-19 RX ADMIN — ERTAPENEM SODIUM 1 G: 1 INJECTION, POWDER, LYOPHILIZED, FOR SOLUTION INTRAMUSCULAR; INTRAVENOUS at 19:47

## 2024-11-19 RX ADMIN — METHYLPREDNISOLONE SODIUM SUCCINATE 100 MG: 125 INJECTION, POWDER, FOR SOLUTION INTRAMUSCULAR; INTRAVENOUS at 20:41

## 2024-11-19 NOTE — PROGRESS NOTES
Care Management Discharge Note    Discharge Date: 11/19/2024       Discharge Disposition: Home    Discharge Services: None    Discharge DME: None    Discharge Transportation: family or friend will provide    Private pay costs discussed: Not applicable    Does the patient's insurance plan have a 3 day qualifying hospital stay waiver?  No    PAS Confirmation Code:    Patient/family educated on Medicare website which has current facility and service quality ratings: yes    Education Provided on the Discharge Plan: Yes  Persons Notified of Discharge Plans: pt and [facility]  Patient/Family in Agreement with the Plan: yes    Handoff Referral Completed: No, handoff not indicated or clinically appropriate    Additional Information:  Pt to discharge back to home [facility].     I to go out tomorrow to do infusion teach.     Yobani Murphy RN

## 2024-11-19 NOTE — PLAN OF CARE
Goal Outcome Evaluation:      Plan of Care Reviewed With: patient    Overall Patient Progress: improvingOverall Patient Progress: improving    Outcome Evaluation: discharging home tonight with iv abx    Midline IV placed in R arm. Pt will receive one dose of IV ertapenem at 8pm and then discharge home. Home infusion RN will meet with pt tomorrow.     AVS reviewed with pt. She would like the RN doing her discharge to review instructions with her daughter when she comes to pick her up at 8:30pm

## 2024-11-19 NOTE — PROCEDURES
Midline Insertion Procedure Note    Pt. Name:   Chicho Jin  MRN:          [id number]    Procedure: Insertion of a  SINGLE Lumen  4 fr  BARD (non-valved) Power MIDLINE catheter.  Lot number [id number].     Indications: Antibiotic (x10 days)    Contraindication(s): None      Procedure Details:    Patient identified with 2 identifiers.      Central line insertion bundle followed: Hand hygiene performed prior to procedure, site cleansed with Cholraprep (CHG), hat, mask, sterile gloves,sterile gown worn, patient draped with maximum barrier head to toe drape, sterile field maintained.    The right upper arm BASILIC vein was assessed by ultrasound and found to be anechoic, compressible, patent, and of adequate size.     Lidocaine 1% 2.5 ml administered SQ to the insertion site.     Modified Seldinger Technique (MST) used for insertion,  ONE attempt(s) required to access vein. Imaging during access shows the needle within the vein.    Midline catheter was advance through peel-away sheath.       Catheter threaded without difficulty. Tip placement approximately in the axillary region. Good blood return noted.    Catheter was flushed with 20 cc normal saline.     Catheter secured with Statlock, tissue adhesive (on insertion site only),Biopatch (CHG) and Tegaderm dressing applied.    The sharps that are included in the insertion kit were accounted for and disposed of in the sharps container prior to breakdown of the sterile field.     Patient  tolerated procedure well.    Patient's primary RN notified catheter is a MIDLINE, and is ready for use.    Findings:    Total catheter length  15 cm, with 0 cm exposed. Mid upper arm circumference is 28 cm.     Comments:      A midline catheter is a form of peripheral venous access. Not recommended for the infusion of vesicants (Vancomycin, Vasopressors, TPN, etc.)      Jatinder Liriano, MSN, RN, VA-BC   Vascular Access - [facility]

## 2024-11-19 NOTE — PROGRESS NOTES
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  Pt would like to go with [facility] for home infusion needs.     Plan is for pt to discharge on Ertapenem daily. Writer waiting to hear if pt will discharge today or not. Pt will need a dose of Ertapenem prior to discharging, as she has been on Meropenem.  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  [facility] will plan to have a nurse do the home infusion in the home on the day after discharge.  [facility] will be providing the home nursing visits.    Updated Yobani Murphy RN CM. Thank you for the referral.    Herlinda King RN, BSN  [facility] Liaison  [phone] (Mon through Fri, 8:00 am-5:00 pm)  [phone] (Office)

## 2024-11-19 NOTE — PROGRESS NOTES
"[facility] ID Inpatient follow up       Patient:  Chicho ONTIVEROS Her  Date of birth 1965, Medical record number [id number]  Date of Visit:  2024  Attending Physician: Emery Castro MD         Assessment and Recommendations:   Assessment:  Chicho ONTIVEROS Her is a 59 year old female with   ESBL E. coli bacteremia.  Source is urinary given symptomatic UTI on admission.  Urine culture no growth.  Feels better on IV meropenem    Recommendations:  Okay to place midline for 10 days of IV ertapenem 1 g daily outpatient.  Remove midline after last dose of IV meropenem.    Discussed with the patient, nursing staff.    ID will sign off.    Keturah Figueroa MD.  [facility].   [facility]  Office Telephone [phone].  Fax [phone]  [facility] paging            Interval History:     HPI:  The interval history was reviewed.   Continue to feel better.  He has per E. coli and blood cultures.    Pertinent cultures include:  No results found for: \"CULT\"    Recent Inflammatory Biomarkers:   Recent Labs   Lab Test 24  0628 24  0604 24  1824 24  1921 23  1658 22  1547   PCAL  --   --  1.02*  --   --   --    WBC 8.1 14.4* 12.7* 9.5 8.9 9.3            Review of Systems:   CONSTITUTIONAL:    Temp Max: Temp (24hrs), Av.4  F (36.9  C), Min:98.3  F (36.8  C), Max:98.6  F (37  C)   .  Negative except for findings in the HPI.           Current Medications (antimicrobials listed in bold):     Current Facility-Administered Medications   Medication Dose Route Frequency Provider Last Rate Last Admin    insulin aspart (NovoLOG) injection (RAPID ACTING)  1-7 Units Subcutaneous TID AC Jennifer Tiwari MD        insulin aspart (NovoLOG) injection (RAPID ACTING)  1-5 Units Subcutaneous At Bedtime Jennifer Tiwari MD        meropenem (MERREM) 1 g vial to attach to  mL bag  1 g Intravenous Q8H Emery Castro MD   1 g at 24 " 0529    phenazopyridine (PYRIDIUM) tablet 100 mg  100 mg Oral TID w/meals Emery Castro MD   100 mg at 11/19/24 0752    sodium chloride (PF) 0.9% PF flush 3 mL  3 mL Intracatheter Q8H Jennifer Tiwari MD   3 mL at 11/19/24 0752              Allergies:   No Known Allergies         Physical Exam:   Vitals were reviewed  Patient Vitals for the past 24 hrs:   BP Temp Temp src Pulse Resp SpO2   11/19/24 0735 138/74 98.4  F (36.9  C) Oral 77 16 95 %   11/19/24 0138 (!) 140/71 98.3  F (36.8  C) Oral 71 16 92 %   11/18/24 1538 119/79 98.4  F (36.9  C) Oral 84 16 93 %   11/18/24 1100 124/72 98.6  F (37  C) Oral 85 16 94 %       Physical Examination:  Gen: Pleasant in no acute distress.  HEENT: NCAT. EOMI. PERRL.  Neck: No bruit, JVD or thyromegaly.  Lungs: Clear to ascultation bilat with no crackles or wheezes.  Card: RRR. NSR. No RMG. Peripheral pulses present and symmetric. No edema.  Abd: Soft NT ND. No mass. Normal bowel sounds.  Skin: No rash.  Extr: No edema.  Neuro: Alert and oriented to place time and person. Cranial nerves II to XII intact. Motor and sensory intact. Normal gait.            Laboratory Data:   ID Labs:  Microbiology labs:  ESBL E. coli  Blood Culture Peripheral Blood  Order: [id number]  Collected 11/16/2024  6:52 PM       Status: Final result       Visible to patient: Yes (not seen)    Specimen Information: Peripheral Blood   0 Result Notes  Culture Positive on the 1st day of incubation Abnormal    Escherichia coli ESBL Panic    1 of 2 bottles        Resulting Agency: [facility]     Susceptibility     Escherichia coli ESBL     ANNA     Amikacin <=2 ug/mL Susceptible *     Ampicillin >=32 ug/mL Resistant     Ampicillin/ Sulbactam 4 ug/mL Susceptible *     Cefepime  Resistant     Cefotaxime  Resistant *     Cefoxitin <=4 ug/mL Susceptible *     Ceftazidime  Resistant     Ceftriaxone  Resistant     Ciprofloxacin <=0.25 ug/mL Susceptible     Extended Spectrum Beta-Lactamase Positive ug/mL ESBL Positive *      Gentamicin <=1 ug/mL Susceptible     Levofloxacin 1 ug/mL Intermediate     Meropenem <=0.25 ug/mL Susceptible 1     Nitrofurantoin <=16 ug/mL Susceptible *     Piperacillin/Tazobactam <=4 ug/mL Susceptible     Tobramycin <=1 ug/mL Susceptible     Trimethoprim/Sulfamethoxazole >16/304 ug/mL Resistant              * Suppressed Antibiotic   1 Enterobacterales that are susceptible to meropenem are usually susceptible to ertapenem.        No lab results found.  Recent Labs   Lab Test 11/18/24 0628 11/17/24 0604 11/16/24 1824 06/05/24 1921 09/13/23 1658 05/09/22  1547   WBC 8.1 14.4* 12.7* 9.5 8.9 9.3     Recent Labs   Lab Test 11/18/24 0628 11/17/24 0605 11/16/24 1824 06/05/24  1921   CR 0.79 0.87 0.96* 0.90   GFRESTIMATED 86 77 68 74       Hematology Studies  Recent Labs   Lab Test 11/18/24 0628 11/17/24 0604 11/16/24 1824 06/05/24 1921 09/13/23  1658 05/09/22  1547   WBC 8.1 14.4* 12.7* 9.5 8.9 9.3   HGB 11.6* 11.5* 13.0 13.9 13.0 15.1   HCT 36.9 37.9 40.1 43.8 41.4 46.8    175 226 243 190 243       Metabolic  Recent Labs   Lab Test 11/18/24 0628 11/17/24 0605 11/16/24  1824    141 139   BUN 7.7 9.2 10.8   CO2 23 22 22   CR 0.79 0.87 0.96*   GFRESTIMATED 86 77 68       Hepatic Studies  Recent Labs   Lab Test 11/17/24 0605 06/05/24 1921 05/09/22  1547   BILITOTAL 0.3 0.2 0.3   ALKPHOS 92 112 107   ALBUMIN 3.3* 3.9 3.3*   AST 18 21 35   ALT 13 21 58*       ImmunologlobulinsNo lab results found.         Imaging Data:   Reviewed

## 2024-11-19 NOTE — PLAN OF CARE
Goal Outcome Evaluation:              VSS.Tylenol given for pain control. IV saline locked in between IV Merrem.            Problem: Adult Inpatient Plan of Care  Goal: Optimal Comfort and Wellbeing  Outcome: Progressing     Problem: UTI (Urinary Tract Infection)  Goal: Improved Infection Symptoms  Outcome: Progressing     Problem: Fever  Goal: Body Temperature in Desired Range  Outcome: Progressing

## 2024-11-20 LAB
GLUCOSE BLDC GLUCOMTR-MCNC: 155 MG/DL (ref 70–99)
GLUCOSE BLDC GLUCOMTR-MCNC: 180 MG/DL (ref 70–99)
GLUCOSE BLDC GLUCOMTR-MCNC: 202 MG/DL (ref 70–99)
GLUCOSE BLDC GLUCOMTR-MCNC: 204 MG/DL (ref 70–99)
GLUCOSE BLDC GLUCOMTR-MCNC: 274 MG/DL (ref 70–99)
HOLD SPECIMEN: NORMAL
MAGNESIUM SERPL-MCNC: 1.9 MG/DL (ref 1.7–2.3)
POTASSIUM SERPL-SCNC: 4.4 MMOL/L (ref 3.4–5.3)

## 2024-11-20 PROCEDURE — 120N000001 HC R&B MED SURG/OB

## 2024-11-20 PROCEDURE — 250N000013 HC RX MED GY IP 250 OP 250 PS 637: Performed by: STUDENT IN AN ORGANIZED HEALTH CARE EDUCATION/TRAINING PROGRAM

## 2024-11-20 PROCEDURE — 84132 ASSAY OF SERUM POTASSIUM: CPT | Performed by: INTERNAL MEDICINE

## 2024-11-20 PROCEDURE — 36415 COLL VENOUS BLD VENIPUNCTURE: CPT | Performed by: INTERNAL MEDICINE

## 2024-11-20 PROCEDURE — 250N000011 HC RX IP 250 OP 636: Performed by: INTERNAL MEDICINE

## 2024-11-20 PROCEDURE — 99232 SBSQ HOSP IP/OBS MODERATE 35: CPT | Performed by: INTERNAL MEDICINE

## 2024-11-20 PROCEDURE — 83735 ASSAY OF MAGNESIUM: CPT | Performed by: INTERNAL MEDICINE

## 2024-11-20 RX ORDER — MEROPENEM 1 G/1
1 INJECTION, POWDER, FOR SOLUTION INTRAVENOUS EVERY 8 HOURS
Status: DISCONTINUED | OUTPATIENT
Start: 2024-11-20 | End: 2024-11-21 | Stop reason: HOSPADM

## 2024-11-20 RX ADMIN — PHENAZOPYRIDINE 100 MG: 100 TABLET ORAL at 09:19

## 2024-11-20 RX ADMIN — PHENAZOPYRIDINE 100 MG: 100 TABLET ORAL at 13:03

## 2024-11-20 RX ADMIN — PHENAZOPYRIDINE 100 MG: 100 TABLET ORAL at 18:16

## 2024-11-20 RX ADMIN — MEROPENEM 1 G: 1 INJECTION, POWDER, FOR SOLUTION INTRAVENOUS at 13:05

## 2024-11-20 RX ADMIN — MEROPENEM 1 G: 1 INJECTION, POWDER, FOR SOLUTION INTRAVENOUS at 20:58

## 2024-11-20 NOTE — PROGRESS NOTES
Chart reviewed at the request of care management.  Apparently had rash after the first dose of Ertapenem.  This is interesting given that she has tolerated meropenem for a few days.    Can change to meropenem and observe.    Keturah Figueroa MD

## 2024-11-20 NOTE — PROGRESS NOTES
"[facility] PROGRESS NOTE      Identification/Summary:   51-year-old female past med history significant for type 2 diabetes and asthma presented to [facility] with dysuria subsequently found to have a UTI, blood cultures came back positive for ESBL E. coli.  Patient was supposed to discharge on ertapenem then developed allergic reaction with hives, chest pain, shortness of breath, improved with epi Pepcid Benadryl and Solu-Medrol.  Discharge was canceled.     Sepsis  UTI  ESBL E. coli bacteremia-  Was tolerating meropenem.  Switched to ertapenem for discharge on 11/19, discontinued because of allergic reaction.  Will await ID input regarding discharge antibiotic plan.  -Pyridium for urinary pain     T2DM-blood sugars have been reasonable  -Hold PTA metformin  -Medium dose sliding scale insulin     Hx of asthma-Not on any medications PTA, no respiratory concerns.         Diet: Moderate Consistent Carb (60 g CHO per Meal) Diet  Diet  DVT Prophylaxis:   Ambulating.  Code Status: Full Code    Clinically Significant Risk Factors               # Hypoalbuminemia: Lowest albumin = 3.3 g/dL at 11/17/2024  6:05 AM, will monitor as appropriate                # Obesity: Estimated body mass index is 32.38 kg/m  as calculated from the following:    Height as of this encounter: 1.499 m (4' 11\").    Weight as of this encounter: 72.7 kg (160 lb 4.8 oz)., PRESENT ON ADMISSION     # Financial/Environmental Concerns: none  # Asthma: noted on problem list           Medically Ready for Discharge: Anticipated Tomorrow       Interval History/Subjective:  Was seen earlier in the day.  She was doing well, plan was to discharge her this evening with ertapenem for 10 days then developed allergic reaction with redness itching hives, chest heaviness.  Rapid response was called.  Ertapenem discontinued, received epinephrine, Solu-Medrol, Benadryl, Pepcid with improvement.  Discharge canceled.    Physical Exam/Objective:  Vitals " "I/O   Vital signs:  Temp: 98.2  F (36.8  C) Temp src: Oral BP: (!) 142/83 Pulse: 88   Resp: 14 SpO2: 93 % O2 Device: None (Room air)   Height: 149.9 cm (4' 11\") Weight: 72.7 kg (160 lb 4.8 oz)  Estimated body mass index is 32.38 kg/m  as calculated from the following:    Height as of this encounter: 1.499 m (4' 11\").    Weight as of this encounter: 72.7 kg (160 lb 4.8 oz).      I/O last 3 completed shifts:  In: 960 [P.O.:960]  Out: 802 [Urine:802]     Body mass index is 32.38 kg/m .    General Appearance:  Alert, cooperative, no distress   Head:  Normocephalic, atraumatic   Eyes:  PERRL    Throat:  mucosa; moist   Neck: No JVD, thyromegaly   Lungs:   Clear to auscultation bilaterally, respirations unlabored   Chest Wall:  No tenderness or deformity   Heart:  Regular rate and rhythm, S1, S2 normal,no murmur   Abdomen:   Soft, non tender, non distended, bowel sounds present, no guarding or rigidity   Extremities: No edema, no joint swelling   Skin: Skin color, texture, turgor normal, no rashes or lesions   Neurologic: Alert and oriented X 3, Moves all 4 extremities       Medications:   Personally Reviewed.  Current Facility-Administered Medications   Medication Dose Route Frequency Provider Last Rate Last Admin    insulin aspart (NovoLOG) injection (RAPID ACTING)  1-7 Units Subcutaneous TID AC Jennifer Tiwari MD   1 Units at 11/19/24 1302    insulin aspart (NovoLOG) injection (RAPID ACTING)  1-5 Units Subcutaneous At Bedtime Jennifer Tiwari MD   1 Units at 11/19/24 2132    phenazopyridine (PYRIDIUM) tablet 100 mg  100 mg Oral TID w/meals Emery Castro MD   100 mg at 11/19/24 1750    sodium chloride (PF) 0.9% PF flush 10 mL  10 mL Intracatheter Q8H Jamia Wheeler MD   10 mL at 11/19/24 1946    sodium chloride (PF) 0.9% PF flush 3 mL  3 mL Intracatheter Q8H Jennifer Tiwari MD   3 mL at 11/19/24 6848       Data reviewed today: I personally reviewed all new medications, labs, imaging/diagnostics reports over the " past 24 hours. Pertinent findings include    Labs:  Most Recent 3 CBC's:  Recent Labs   Lab Test 11/18/24  0628 11/17/24  0604 11/16/24  1824   WBC 8.1 14.4* 12.7*   HGB 11.6* 11.5* 13.0   MCV 86 88 84    175 226     Most Recent 3 BMP's:  Recent Labs   Lab Test 11/20/24  0158 11/19/24  2131 11/19/24  1729 11/19/24  0748 11/19/24  0641 11/18/24  1208 11/18/24  0628 11/17/24  0733 11/17/24  0605 11/16/24  2136 11/16/24  1824   NA  --   --   --   --   --   --  141  --  141  --  139   POTASSIUM  --   --   --   --  4.1  --  4.0  --  4.6  --  3.6   CHLORIDE  --   --   --   --   --   --  106  --  108*  --  101   CO2  --   --   --   --   --   --  23  --  22  --  22   BUN  --   --   --   --   --   --  7.7  --  9.2  --  10.8   CR  --   --   --   --   --   --  0.79  --  0.87  --  0.96*   ANIONGAP  --   --   --   --   --   --  12  --  11  --  16*   EKATERINA  --   --   --   --   --   --  8.8  --  8.7*  --  8.5*   * 215* 100*   < >  --    < > 104*   < > 116*   < > 96    < > = values in this interval not displayed.     Most Recent 2 LFT's:  Recent Labs   Lab Test 11/17/24  0605 06/05/24  1921   AST 18 21   ALT 13 21   ALKPHOS 92 112   BILITOTAL 0.3 0.2       Imaging:   No results found for this or any previous visit (from the past 24 hours).    50 MINUTES SPENT BY ME on the date of service doing chart review, history, exam, documentation & further activities per the note.    Jamia Wheeler MD  Hospitalist  [facility]

## 2024-11-20 NOTE — PROGRESS NOTES
"[facility] Medicine PROGRESS NOTE      Identification/Summary:   51-year-old female past med history significant for type 2 diabetes and asthma presented to [facility] with dysuria subsequently found to have a UTI, blood cultures came back positive for ESBL E. coli.  Patient was supposed to discharge on ertapenem then developed allergic reaction with hives, chest pain, shortness of breath, improved with epi Pepcid Benadryl and Solu-Medrol.  Discharge was canceled on 11/19, antibiotics changed to meropenem, will observe for any allergic symptoms today.     Sepsis  UTI  ESBL E. coli bacteremia-  Was tolerating meropenem.  Switched to ertapenem for discharge on 11/19, discontinued because of allergic reaction.  Per ID recommendations changed back to meropenem, will monitor inpatient today per  -Pyridium for urinary pain     T2DM-blood sugars have been reasonable  -Hold PTA metformin  -Medium dose sliding scale insulin     Hx of asthma-Not on any medications PTA, no respiratory concerns.         Diet: Moderate Consistent Carb (60 g CHO per Meal) Diet  Diet  DVT Prophylaxis:   Ambulating.  Code Status: Full Code    Clinically Significant Risk Factors               # Hypoalbuminemia: Lowest albumin = 3.3 g/dL at 11/17/2024  6:05 AM, will monitor as appropriate                # Obesity: Estimated body mass index is 32.38 kg/m  as calculated from the following:    Height as of this encounter: 1.499 m (4' 11\").    Weight as of this encounter: 72.7 kg (160 lb 4.8 oz)., PRESENT ON ADMISSION       # Financial/Environmental Concerns: none  # Asthma: noted on problem list           Medically Ready for Discharge: Anticipated Tomorrow       Interval History/Subjective:  Denies any rash, itching, shortness of breath, chest pain.  She is sitting up in the chair.  Her daughter who works in the pharmacy is at bedside.  Did not try any new foods yesterday, no new soaps.  Denies any urinary symptoms, abdominal pain.    Physical " "Exam/Objective:  Vitals I/O   Vital signs:  Temp: 98.1  F (36.7  C) Temp src: Oral BP: 133/85 Pulse: 92   Resp: 16 SpO2: 95 % O2 Device: None (Room air)   Height: 149.9 cm (4' 11\") Weight: 72.7 kg (160 lb 4.8 oz)  Estimated body mass index is 32.38 kg/m  as calculated from the following:    Height as of this encounter: 1.499 m (4' 11\").    Weight as of this encounter: 72.7 kg (160 lb 4.8 oz).      I/O last 3 completed shifts:  In: 960 [P.O.:960]  Out: 802 [Urine:802]     Body mass index is 32.38 kg/m .    General Appearance:  Alert, cooperative, no distress   Head:  Normocephalic, atraumatic   Eyes:  PERRL    Throat:  mucosa; moist   Neck: No JVD, thyromegaly   Lungs:   Clear to auscultation bilaterally, respirations unlabored   Chest Wall:  No tenderness or deformity   Heart:  Regular rate and rhythm, S1, S2 normal,no murmur   Abdomen:   Soft, non tender, non distended, bowel sounds present, no guarding or rigidity   Extremities: No edema, no joint swelling   Skin: Skin color, texture, turgor normal, no rashes or lesions   Neurologic: Alert and oriented X 3, Moves all 4 extremities       Medications:   Personally Reviewed.  Current Facility-Administered Medications   Medication Dose Route Frequency Provider Last Rate Last Admin    insulin aspart (NovoLOG) injection (RAPID ACTING)  1-7 Units Subcutaneous TID AC Jennifer iTwari MD   2 Units at 11/20/24 1303    insulin aspart (NovoLOG) injection (RAPID ACTING)  1-5 Units Subcutaneous At Bedtime Jennifer Tiwari MD   1 Units at 11/19/24 2132    meropenem (MERREM) 1 g vial to attach to  mL bag  1 g Intravenous Q8H Jamia Wheeler MD   1 g at 11/20/24 1305    phenazopyridine (PYRIDIUM) tablet 100 mg  100 mg Oral TID w/meals Emery Castro MD   100 mg at 11/20/24 1303    sodium chloride (PF) 0.9% PF flush 10 mL  10 mL Intracatheter Q8H Jamia Wheeler MD   10 mL at 11/19/24 1946    sodium chloride (PF) 0.9% PF flush 3 mL  3 mL Intracatheter Q8H Jennifer Tiwari, " MD   3 mL at 11/20/24 1313       Data reviewed today: I personally reviewed all new medications, labs, imaging/diagnostics reports over the past 24 hours. Pertinent findings include    Labs:  Most Recent 3 CBC's:  Recent Labs   Lab Test 11/18/24  0628 11/17/24  0604 11/16/24  1824   WBC 8.1 14.4* 12.7*   HGB 11.6* 11.5* 13.0   MCV 86 88 84    175 226     Most Recent 3 BMP's:  Recent Labs   Lab Test 11/20/24  1246 11/20/24  0828 11/20/24  0801 11/20/24  0158 11/19/24  0748 11/19/24  0641 11/18/24  1208 11/18/24  0628 11/17/24  0733 11/17/24  0605 11/16/24  2136 11/16/24  1824   NA  --   --   --   --   --   --   --  141  --  141  --  139   POTASSIUM  --   --  4.4  --   --  4.1  --  4.0  --  4.6  --  3.6   CHLORIDE  --   --   --   --   --   --   --  106  --  108*  --  101   CO2  --   --   --   --   --   --   --  23  --  22  --  22   BUN  --   --   --   --   --   --   --  7.7  --  9.2  --  10.8   CR  --   --   --   --   --   --   --  0.79  --  0.87  --  0.96*   ANIONGAP  --   --   --   --   --   --   --  12  --  11  --  16*   EKATERINA  --   --   --   --   --   --   --  8.8  --  8.7*  --  8.5*   * 180*  --  204*   < >  --    < > 104*   < > 116*   < > 96    < > = values in this interval not displayed.     Most Recent 2 LFT's:  Recent Labs   Lab Test 11/17/24  0605 06/05/24  1921   AST 18 21   ALT 13 21   ALKPHOS 92 112   BILITOTAL 0.3 0.2       Imaging:   No results found for this or any previous visit (from the past 24 hours).    45 MINUTES SPENT BY ME on the date of service doing chart review, history, exam, documentation & further activities per the note.    Jamia Wheeler MD  Hospitalist  [facility]

## 2024-11-20 NOTE — SIGNIFICANT EVENT
Significant Event Note      Time:  2020, rapid response    [facility] was called to bedside for an allergic reaction.  Pt received a partial dose of ertapenem when she  Developed an all over body rash.  (Redness, itching, hives)  Along with hypertension and chest heaviness.      The infusion was discontinued.    On arrival she was awake, alert without STS.  She  Had no airway compromise.      Per discussion with RN she was given medications for  A severe allergic reaction.  (Benadryl, pepcid, solumedrol,  Epinephrine)    She immediately improved.    The ertapenem will be discontinued.    The nocturnist will be notified.     ELEN Wells.  HMS

## 2024-11-20 NOTE — PROGRESS NOTES
IV ertapenem started at 1947. Plan to discharge patient after finishing dose. At 2022 patient reports severe itching and chest tightness. IV stopped. Rapid Response called 2027. Elevated heart rate and hypertension noted.  500 mL normal saline bolus given. IM epi, IV benadryl 50 mg, and IV solumedrol given. Patient reports improvement in itching, chest tightness, and shortness of breath.

## 2024-11-20 NOTE — PROGRESS NOTES
Rapid respond called, RT present. No intervention needed from RT. Dismissed    Carlota Mendoza, RT

## 2024-11-20 NOTE — PLAN OF CARE
Goal Outcome Evaluation:             Sligthty elevated blood pressures and heart rate overnight. Rapid response called for severe allergic reaction to ertapenem, the IV abx that patient was going to discharge with. Daughter in the room. Patient reports improvement of symptoms.

## 2024-11-20 NOTE — SIGNIFICANT EVENT
Attended rapid response. Hospitalist was present and managing the rapid. Was dismissed by hospitalist.     Chloe Ornelas MD  PGY3  [facility] Medicine Residency  November 19, 2024

## 2024-11-21 ENCOUNTER — HOME INFUSION BILLING (OUTPATIENT)
Dept: HOME HEALTH SERVICES | Facility: HOME HEALTH | Age: 59
End: 2024-11-21
Payer: COMMERCIAL

## 2024-11-21 VITALS
WEIGHT: 161.13 LBS | SYSTOLIC BLOOD PRESSURE: 139 MMHG | DIASTOLIC BLOOD PRESSURE: 84 MMHG | HEIGHT: 59 IN | TEMPERATURE: 98.1 F | HEART RATE: 85 BPM | OXYGEN SATURATION: 95 % | RESPIRATION RATE: 16 BRPM | BODY MASS INDEX: 32.48 KG/M2

## 2024-11-21 LAB
GLUCOSE BLDC GLUCOMTR-MCNC: 111 MG/DL (ref 70–99)
GLUCOSE BLDC GLUCOMTR-MCNC: 156 MG/DL (ref 70–99)
GLUCOSE BLDC GLUCOMTR-MCNC: 81 MG/DL (ref 70–99)
HOLD SPECIMEN: NORMAL
MAGNESIUM SERPL-MCNC: 2 MG/DL (ref 1.7–2.3)
POTASSIUM SERPL-SCNC: 3.9 MMOL/L (ref 3.4–5.3)

## 2024-11-21 PROCEDURE — 84132 ASSAY OF SERUM POTASSIUM: CPT | Performed by: INTERNAL MEDICINE

## 2024-11-21 PROCEDURE — 36415 COLL VENOUS BLD VENIPUNCTURE: CPT | Performed by: INTERNAL MEDICINE

## 2024-11-21 PROCEDURE — 99239 HOSP IP/OBS DSCHRG MGMT >30: CPT | Performed by: INTERNAL MEDICINE

## 2024-11-21 PROCEDURE — 83735 ASSAY OF MAGNESIUM: CPT | Performed by: INTERNAL MEDICINE

## 2024-11-21 PROCEDURE — 250N000013 HC RX MED GY IP 250 OP 250 PS 637: Performed by: STUDENT IN AN ORGANIZED HEALTH CARE EDUCATION/TRAINING PROGRAM

## 2024-11-21 PROCEDURE — A4245 ALCOHOL WIPES PER BOX: HCPCS

## 2024-11-21 PROCEDURE — A4452 WATERPROOF TAPE: HCPCS

## 2024-11-21 PROCEDURE — 250N000011 HC RX IP 250 OP 636: Performed by: INTERNAL MEDICINE

## 2024-11-21 PROCEDURE — A4221 SUPP NON-INSULIN INF CATH/WK: HCPCS

## 2024-11-21 PROCEDURE — A4215 STERILE NEEDLE: HCPCS

## 2024-11-21 PROCEDURE — S9502 HIT ANTIBIOTIC Q8H DIEM: HCPCS

## 2024-11-21 PROCEDURE — A4213 20+ CC SYRINGE ONLY: HCPCS

## 2024-11-21 RX ORDER — MEROPENEM 1 G/1
1 INJECTION, POWDER, FOR SOLUTION INTRAVENOUS EVERY 8 HOURS
Qty: 27 EACH | Refills: 0 | Status: DISPENSED | OUTPATIENT
Start: 2024-11-21 | End: 2024-12-02

## 2024-11-21 RX ORDER — MEROPENEM 1 G/1
1 INJECTION, POWDER, FOR SOLUTION INTRAVENOUS EVERY 8 HOURS
Status: SHIPPED
Start: 2024-11-21 | End: 2024-11-21

## 2024-11-21 RX ADMIN — MEROPENEM 1 G: 1 INJECTION, POWDER, FOR SOLUTION INTRAVENOUS at 05:01

## 2024-11-21 RX ADMIN — PHENAZOPYRIDINE 100 MG: 100 TABLET ORAL at 12:33

## 2024-11-21 RX ADMIN — MEROPENEM 1 G: 1 INJECTION, POWDER, FOR SOLUTION INTRAVENOUS at 12:30

## 2024-11-21 RX ADMIN — PHENAZOPYRIDINE 100 MG: 100 TABLET ORAL at 08:10

## 2024-11-21 ASSESSMENT — ACTIVITIES OF DAILY LIVING (ADL)
ADLS_ACUITY_SCORE: 0

## 2024-11-21 NOTE — PROGRESS NOTES
Patient discharged from the hospital to home with family at 1400. Discharge instructions reviewed and given to pt and her family. Pt belongings sent with pt/family.

## 2024-11-21 NOTE — PROGRESS NOTES
Care Management Discharge Note    Discharge Date: 11/21/2024       Discharge Disposition: Home, Home Infusion    Discharge Services: None    Discharge DME: None    Discharge Transportation: family or friend will provide    Education Provided on the Discharge Plan: Yes (AVS per bedside RN)    Persons Notified of Discharge Plans: patient    Patient/Family in Agreement with the Plan: yes         Additional Information:  CM reviewed chart. CM coordinating with [facility] Lia[facility]. MD Placed discharge orders. Teaching for home infusion will be done this afternoon (~2 pm) with family. Family will provide transportation home at discharge.         Marquita Michele RN  Care Coordinator

## 2024-11-21 NOTE — PLAN OF CARE
Goal Outcome Evaluation:             VSS on room air. Tolerating IV meropenem. Up independently in room.            Problem: UTI (Urinary Tract Infection)  Goal: Improved Infection Symptoms  Outcome: Progressing

## 2024-11-21 NOTE — DISCHARGE SUMMARY
"  [facility] MEDICINE  DISCHARGE SUMMARY     Primary Care Physician: Bony Mancilla  Admission Date: 11/16/2024   Discharge Provider: Jamia Wheeler MD Discharge Date: 11/21/2024   Diet: Orders Placed This Encounter      Moderate Consistent Carb (60 g CHO per Meal) Diet      Diet      Code Status: Full Code   Activity: activity as tolerated   [facility]      Condition at Discharge: Stable      REASON FOR PRESENTATION(See Admission Note for Details)   ***    PRINCIPAL & ACTIVE DISCHARGE DIAGNOSES     Active Problems:    Hypomagnesemia    Pyelonephritis    Sepsis without acute organ dysfunction, due to unspecified organism (H)      SIGNIFICANT FINDINGS (Imaging, labs):     {What data do you want to display?:784441}     PENDING LABS         PROCEDURES ( this hospitalization only)          RECOMMENDATION FOR F/U VISIT     ***    DISPOSITION     {Blank single:88688::\"Home\",\"Home with home care\",\"Skilled Nursing Facility\",\"Assisted Living Facility\",\"[facility]\",\"[facility]\",\"[facility]\",\"[facility]\"}    SUMMARY OF HOSPITAL COURSE:      ***    Discharge Medications with Med changes:        Review of your medicines        UNREVIEWED medicines. Ask your doctor about these medicines        Dose / Directions   ondansetron 4 MG ODT tab  Commonly known as: ZOFRAN ODT  Ask about: Should I take this medication?      Dose: 4 mg  Take 1 tablet (4 mg) by mouth every 6 hours as needed for nausea.  Quantity: 20 tablet  Refills: 0            START taking        Dose / Directions   Emergency Supply Kit (Central)      Dose: 1 kit  Patient use for emergency only. Contents: 3 sodium chloride 0.9% flushes, 1 dressing kit, 1 microclave ext set 14\", 4 nitrile gloves (med), 6 alcohol prep pads, 1 bacitracin, 1 syringe (10 cc 20 G 1\"). Call [phone] to reorder.  Quantity: [id number] kit  Refills: 0     meropenem 1 g injection  Commonly known as: MERREM  Indication: " Pyelonephritis      Dose: 1 g  Inject 20 mLs (1 g) over 30 minutes into the vein every 8 hours for 9 days.  Refills: 0     sodium chloride (PF) 0.9% PF flush      Dose: 10 mL  Inject 10 mLs into the vein as needed for line flush. Flush IV before and after medication administration as directed and/or at least every 12 hours.  Quantity: [id number] mL  Refills: 0            CONTINUE these medicines which have NOT CHANGED        Dose / Directions   acetaminophen 500 MG tablet  Commonly known as: TYLENOL      Dose: 1,000 mg  Take 1,000 mg by mouth every 6 hours as needed for mild pain.  Refills: 0     HYDROcodone-acetaminophen 5-325 MG tablet  Commonly known as: NORCO      Dose: 1-2 tablet  Take 1-2 tablets by mouth every 6 hours as needed for pain.  Refills: 0     metFORMIN 500 MG 24 hr tablet  Commonly known as: GLUCOPHAGE XR      Dose: 1,000 mg  Take 1,000 mg by mouth 2 times daily (with meals).  Refills: 0            STOP taking      ibuprofen 800 MG tablet  Commonly known as: ADVIL/MOTRIN                  Where to get your medicines        These medications were sent to [facility]  [address] [place] [id number]      Phone: [phone]   Emergency Supply Kit (Central)  sodium chloride (PF) 0.9% PF flush       These medications were sent to [id number] - [place], [place] - [address]  AT [facility] [address]  [address], [place] [address]      Phone: [phone]   ondansetron 4 MG ODT tab              Rationale for medication changes:      ***        Consults   {consultation:54207}      Immunizations given this encounter     [unfilled]      Discharge Procedure Orders   Home Infusion Referral   Referral Priority: Routine: Next available opening Referral Type: Consultation   Number of Visits Requested: 1     Home Care Referral   Referral Priority: Routine: Next available opening Referral Type: Home Health Therapies & Aides  "  Number of Visits Requested: 1     Reason for your hospital stay   Order Comments: ESBL E. coli urinary infection, sepsis     Follow-up and recommended labs and tests    Order Comments: Follow up with primary care provider, Bony Mancilla, within 7 days     Activity   Order Comments: Your activity upon discharge: activity as tolerated     Order Specific Question Answer Comments   Is discharge order? Yes      Diet   Order Comments: Follow this diet upon discharge: Current Diet:Orders Placed This Encounter      Moderate Consistent Carb (60 g CHO per Meal) Diet     Order Specific Question Answer Comments   Is discharge order? Yes      Examination     Vital Signs in last 24 hours:   Vital signs:  Temp: 98.1  F (36.7  C) Temp src: Oral BP: 139/84 Pulse: 85   Resp: 16 SpO2: 95 % O2 Device: None (Room air)   Height: 149.9 cm (4' 11\") Weight: 73.1 kg (161 lb 2 oz)  Estimated body mass index is 32.54 kg/m  as calculated from the following:    Height as of this encounter: 1.499 m (4' 11\").    Weight as of this encounter: 73.1 kg (161 lb 2 oz).       Pertinent positives on exam:      Please see EMR for more detailed significant labs, imaging, consultant notes etc.  Total time spent on discharge: > 35 minutes    Jamia Wheeler MD   [facility] Hospitalist Service: Ph:[phone]    CC:Bony Mancilla      " pain.  Refills: 0     metFORMIN 500 MG 24 hr tablet  Commonly known as: GLUCOPHAGE XR      Dose: 1,000 mg  Take 1,000 mg by mouth 2 times daily (with meals).  Refills: 0            STOP taking      ibuprofen 800 MG tablet  Commonly known as: ADVIL/MOTRIN                  Where to get your medicines        These medications were sent to [facility]  [address] [facility] [address]      Phone: [phone]   Emergency Supply Kit (Central)  sodium chloride (PF) 0.9% PF flush       These medications were sent to [id number] - [place], [place] - [address] AT [facility] [address]  [address], [place] [address]      Phone: [phone]   ondansetron 4 MG ODT tab              Rationale for medication changes:      Meropenem for ESBL E. coli UTI        Consults   Infectious diseases      Immunizations given this encounter     [unfilled]      Discharge Procedure Orders   Home Infusion Referral   Referral Priority: Routine: Next available opening Referral Type: Consultation   Number of Visits Requested: 1     Home Care Referral   Referral Priority: Routine: Next available opening Referral Type: Home Health Therapies & Aides   Number of Visits Requested: 1     Reason for your hospital stay   Order Comments: ESBL E. coli urinary infection, sepsis     Follow-up and recommended labs and tests    Order Comments: Follow up with primary care provider, Bony Mancilla, within 7 days     Activity   Order Comments: Your activity upon discharge: activity as tolerated     Order Specific Question Answer Comments   Is discharge order? Yes      Diet   Order Comments: Follow this diet upon discharge: Current Diet:Orders Placed This Encounter      Moderate Consistent Carb (60 g CHO per Meal) Diet     Order Specific Question Answer Comments   Is discharge order? Yes      Examination     Vital Signs in last 24 hours:   Vital signs:  Temp: 98.1  F (36.7  " C) Temp src: Oral BP: 139/84 Pulse: 85   Resp: 16 SpO2: 95 % O2 Device: None (Room air)   Height: 149.9 cm (4' 11\") Weight: 73.1 kg (161 lb 2 oz)  Estimated body mass index is 32.54 kg/m  as calculated from the following:    Height as of this encounter: 1.499 m (4' 11\").    Weight as of this encounter: 73.1 kg (161 lb 2 oz).       Pertinent positives on exam:  Abdomen: Soft nontender nondistended bowel sounds present no guarding or rigidity.    Please see EMR for more detailed significant labs, imaging, consultant notes etc.  Total time spent on discharge: > 35 minutes    Jamia Wheeler MD   [facility] Hospitalist Service: Ph:[phone]    CC:Bony Mancilla      "

## 2024-11-21 NOTE — PROGRESS NOTES
[facility]    Writer spoke with pt's dtr, Marine, to discuss discharge plan for today and teach.  Writer plans to meet pt's dtr, Marine, at the hospital around 1;30/2:00 pm today for the teach prior to pt discharging to home.  Pt will get her 1230 dose at the hospital and Marine will next dose her tonight at home around 9 pm.  Updated Marquita Michele RN CM.    Herlinda Knig RN, BSN  [facility] Liaison  [phone] (Mon through Fri, 8:00 am-5:00 pm)  [phone] (Office)    [facility] EDUCATION    Met with patient's dtr, Marine, at bedside for teach of IV abx via SL Midline.  Plan is for patient to discharge on Meropenem 1g q8 hours.  Pt received a dose at 1230 pm and pt will next dose tonight at home around 9 pm.    Provided hands-on teaching using teaching mat and demo equipment.  Patient's dtr was taught how to mix the Meropenem and SAS flushing method.  She was able to provide return demonstration using aseptic technique.  Extension tubing was added to the midline.  IV catheter flushed without resistance and had good blood return.  Delivery of med and supplies will be delivered to patient's home tonight between 6-9 pm.  A nurse  will contact patient to set up the first home nursing visit.    Provided 24/7 phone number and answered all questions.  Patient verbalized understanding of discharge plans.    Thank you for the opportunity to provide infusion services to this patient.  Updated pt's bedside RN and Marquita Michele RN CM.

## 2024-11-21 NOTE — PLAN OF CARE
Goal Outcome Evaluation:       Pt vitals stable. She is independent in the room. She denies any pain. She is on the diabetic diet. BG checks done before meals. BG levels this shift were 180, 202, and 274. Insulin administered per orders. Magnesium and potassium protocol run. Redraw in the morning. Her midline is saline locked.     Problem: Adult Inpatient Plan of Care  Goal: Absence of Hospital-Acquired Illness or Injury  Intervention: Prevent Skin Injury  Recent Flowsheet Documentation  Taken 11/20/2024 1745 by Silvia Mccallum, RN  Body Position: position changed independently  Taken 11/20/2024 0914 by Silvia Mccallum, RN  Body Position: position changed independently     Problem: UTI (Urinary Tract Infection)  Goal: Improved Infection Symptoms  11/20/2024 1804 by Silvia Mccallum, RN  Outcome: Progressing  11/20/2024 0928 by Silvia Mccallum, RN  Outcome: Progressing

## 2024-11-22 PROCEDURE — S9502 HIT ANTIBIOTIC Q8H DIEM: HCPCS

## 2024-11-23 ENCOUNTER — PATIENT OUTREACH (OUTPATIENT)
Dept: CARE COORDINATION | Facility: CLINIC | Age: 59
End: 2024-11-23
Payer: COMMERCIAL

## 2024-11-23 PROCEDURE — S9502 HIT ANTIBIOTIC Q8H DIEM: HCPCS

## 2024-11-23 NOTE — PROGRESS NOTES
"[facility]  Transitions of Care Outreach  Chief Complaint   Patient presents with    Clinic Care Coordination - Post Hospital       Most Recent Admission Date: 11/16/2024   Most Recent Admission Diagnosis: Hypomagnesemia - E83.42  Pyelonephritis - N12  Sepsis without acute organ dysfunction, due to unspecified organism (H) - A41.9     Most Recent Discharge Date: 11/21/2024   Most Recent Discharge Diagnosis: Pyelonephritis - N12  Hypomagnesemia - E83.42  Sepsis without acute organ dysfunction, due to unspecified organism (H) - A41.9     Transitions of Care Assessment    Discharge Assessment  How are you doing now that you are home?: \"I'm doing good\"  How are your symptoms? (Red Flag symptoms escalate to triage hotline per guidelines): Improved  Do you know how to contact your clinic care team if you have future questions or changes to your health status? : Yes  Does the patient have their discharge instructions? : Yes  Does the patient have questions regarding their discharge instructions? : No  Were you started on any new medications or were there changes to any of your previous medications? : Yes  Does the patient have all of their medications?: Yes  Do you have questions regarding any of your medications? : No  Do you have all of your needed medical supplies or equipment (DME)?  (i.e. oxygen tank, CPAP, cane, etc.): Yes    Post-op ([facility] CTA Only)  If the patient had a surgery or procedure, do they have any questions for a nurse?: No    Follow up Plan     Discharge Follow-Up  Discharge follow up appointment scheduled in alignment with recommended follow up timeframe or Transitions of Risk Category? (Low = within 30 days; Moderate= within 14 days; High= within 7 days): No  Patient's follow up appointment not scheduled: Patient declined scheduling support. Education on the importance of transitions of care follow up. Provided scheduling phone number. (Pt will call during the week to schedule " follow-up appointment)    Future Appointments   Date Time Provider Department Center   11/25/2024 12:30 PM Jamee Miramontes, RN [facility]       Outpatient Plan as outlined on AVS reviewed with patient.    For any urgent concerns, please contact our 24 hour nurse triage line: [phone] ([phone])         Suzan Bishop  Community Health Worker  [facility]    *Connected Care Resource Team does NOT follow patient ongoing. Referrals are identified based on internal discharge reports and the outreach is to ensure patient has an understanding of their discharge instructions.

## 2024-11-24 PROCEDURE — S9502 HIT ANTIBIOTIC Q8H DIEM: HCPCS

## 2024-11-25 ENCOUNTER — LAB REQUISITION (OUTPATIENT)
Dept: LAB | Facility: HOSPITAL | Age: 59
End: 2024-11-25
Payer: COMMERCIAL

## 2024-11-25 ENCOUNTER — HOME CARE VISIT (OUTPATIENT)
Dept: HOME HEALTH SERVICES | Facility: HOME HEALTH | Age: 59
End: 2024-11-25
Payer: COMMERCIAL

## 2024-11-25 DIAGNOSIS — N12 TUBULO-INTERSTITIAL NEPHRITIS, NOT SPECIFIED AS ACUTE OR CHRONIC: ICD-10-CM

## 2024-11-25 LAB
ALT SERPL W P-5'-P-CCNC: 27 U/L (ref 0–50)
AST SERPL W P-5'-P-CCNC: 28 U/L (ref 0–45)
BASOPHILS # BLD AUTO: 0 10E3/UL (ref 0–0.2)
BASOPHILS NFR BLD AUTO: 0 %
CREAT SERPL-MCNC: 0.61 MG/DL (ref 0.51–0.95)
EGFRCR SERPLBLD CKD-EPI 2021: >90 ML/MIN/1.73M2
EOSINOPHIL # BLD AUTO: 0.2 10E3/UL (ref 0–0.7)
EOSINOPHIL NFR BLD AUTO: 2 %
ERYTHROCYTE [DISTWIDTH] IN BLOOD BY AUTOMATED COUNT: 13.1 % (ref 10–15)
HCT VFR BLD AUTO: 41 % (ref 35–47)
HGB BLD-MCNC: 12.7 G/DL (ref 11.7–15.7)
IMM GRANULOCYTES # BLD: 0.1 10E3/UL
IMM GRANULOCYTES NFR BLD: 1 %
LYMPHOCYTES # BLD AUTO: 1.8 10E3/UL (ref 0.8–5.3)
LYMPHOCYTES NFR BLD AUTO: 18 %
MCH RBC QN AUTO: 26.3 PG (ref 26.5–33)
MCHC RBC AUTO-ENTMCNC: 31 G/DL (ref 31.5–36.5)
MCV RBC AUTO: 85 FL (ref 78–100)
MONOCYTES # BLD AUTO: 0.8 10E3/UL (ref 0–1.3)
MONOCYTES NFR BLD AUTO: 7 %
NEUTROPHILS # BLD AUTO: 7.6 10E3/UL (ref 1.6–8.3)
NEUTROPHILS NFR BLD AUTO: 73 %
NRBC # BLD AUTO: 0 10E3/UL
NRBC BLD AUTO-RTO: 0 /100
PLATELET # BLD AUTO: 332 10E3/UL (ref 150–450)
RBC # BLD AUTO: 4.82 10E6/UL (ref 3.8–5.2)
WBC # BLD AUTO: 10.4 10E3/UL (ref 4–11)

## 2024-11-25 PROCEDURE — 82565 ASSAY OF CREATININE: CPT | Performed by: STUDENT IN AN ORGANIZED HEALTH CARE EDUCATION/TRAINING PROGRAM

## 2024-11-25 PROCEDURE — 84460 ALANINE AMINO (ALT) (SGPT): CPT | Performed by: STUDENT IN AN ORGANIZED HEALTH CARE EDUCATION/TRAINING PROGRAM

## 2024-11-25 PROCEDURE — 84450 TRANSFERASE (AST) (SGOT): CPT | Performed by: STUDENT IN AN ORGANIZED HEALTH CARE EDUCATION/TRAINING PROGRAM

## 2024-11-25 PROCEDURE — S9502 HIT ANTIBIOTIC Q8H DIEM: HCPCS

## 2024-11-25 PROCEDURE — 85048 AUTOMATED LEUKOCYTE COUNT: CPT | Performed by: STUDENT IN AN ORGANIZED HEALTH CARE EDUCATION/TRAINING PROGRAM

## 2024-11-25 PROCEDURE — 85004 AUTOMATED DIFF WBC COUNT: CPT | Performed by: STUDENT IN AN ORGANIZED HEALTH CARE EDUCATION/TRAINING PROGRAM

## 2024-11-25 NOTE — PROGRESS NOTES
Lab-Only Nursing Note    Labs obtained via VPT    [facility] Tracking number: [id number]    Facility sent to: [facility]    Saline administered (in mLs): 20    Next Visit Plan:   LOT complete on 11/29. Will need visit for Midline pull after confirmation with MD Jamee Miramontes, RN 11/25/2024

## 2024-11-25 NOTE — PROGRESS NOTES
Education Provided:     Patient Education focused on hand hygiene, SAS flushing, scrubbing the hub, medication reconstitution, medication administration, supply storage, midline care, potential midline complications, SOC paperwork, [facility] welcome booklet, Honoring your choices, shannan care bill or rights, frequency and purpose of nursing visits. .     Saline administered (in mLs): 20ml    Next visit plan Pt done with and on 11/29, last dose at 10pm. Midline pull once verified with MD. .       Jamee Miramontes RN 11/25/24

## 2024-11-26 PROCEDURE — S9502 HIT ANTIBIOTIC Q8H DIEM: HCPCS

## 2024-11-27 ENCOUNTER — HOME INFUSION BILLING (OUTPATIENT)
Dept: HOME HEALTH SERVICES | Facility: HOME HEALTH | Age: 59
End: 2024-11-27
Payer: COMMERCIAL

## 2024-11-27 ENCOUNTER — HOME INFUSION (OUTPATIENT)
Dept: HOME HEALTH SERVICES | Facility: HOME HEALTH | Age: 59
End: 2024-11-27
Payer: COMMERCIAL

## 2024-11-27 VITALS
RESPIRATION RATE: 18 BRPM | TEMPERATURE: 97.1 F | HEART RATE: 95 BPM | BODY MASS INDEX: 33.53 KG/M2 | WEIGHT: 166 LBS | DIASTOLIC BLOOD PRESSURE: 100 MMHG | OXYGEN SATURATION: 97 % | SYSTOLIC BLOOD PRESSURE: 150 MMHG

## 2024-11-27 DIAGNOSIS — N12 PYELONEPHRITIS: Primary | ICD-10-CM

## 2024-11-27 PROCEDURE — A4215 STERILE NEEDLE: HCPCS

## 2024-11-27 PROCEDURE — A4221 SUPP NON-INSULIN INF CATH/WK: HCPCS

## 2024-11-27 PROCEDURE — A4213 20+ CC SYRINGE ONLY: HCPCS

## 2024-11-27 PROCEDURE — S9502 HIT ANTIBIOTIC Q8H DIEM: HCPCS

## 2024-11-28 PROCEDURE — S9502 HIT ANTIBIOTIC Q8H DIEM: HCPCS

## 2024-11-29 PROCEDURE — S9502 HIT ANTIBIOTIC Q8H DIEM: HCPCS

## 2024-11-30 ENCOUNTER — TELEPHONE (OUTPATIENT)
Dept: HOME HEALTH SERVICES | Facility: HOME HEALTH | Age: 59
End: 2024-11-30
Payer: COMMERCIAL

## 2024-11-30 ENCOUNTER — HOME CARE VISIT (OUTPATIENT)
Dept: HOME HEALTH SERVICES | Facility: HOME HEALTH | Age: 59
End: 2024-11-30
Payer: COMMERCIAL

## 2024-11-30 PROCEDURE — 99601 HOME NFS VISIT <2 HRS: CPT

## 2024-11-30 PROCEDURE — S9502 HIT ANTIBIOTIC Q8H DIEM: HCPCS

## 2024-11-30 NOTE — TELEPHONE ENCOUNTER
Triage Note/Care Coordination    Identify the person you spoke with and their relationship to the patient: daughterGracy     Reason for the call: Access device problem/concern    CVAD-Related Issues    Type of central venous access device: Other: Midline         Other comments: Gracy states that seems to be leaking under ML dressing was administering abx.         Other    Assessment: Midline leaking.     Interventions/Recommendation/Comments: SNV for ML assess with poss PIV placement until LOT 12/2/24    Outcomes:     What is the plan for ongoing care of this patient: Patient requires additional nurse visit to home    Was there harm, averted harm, or unexpected death of the patient? No    Does the patient/caregiver verbalize agreement with the plan? YES      Follow-Up Communication    None

## 2024-11-30 NOTE — PROGRESS NOTES
Nursing Visit Note:  Nurse visit today for line assessment  for Chicho Jin.     present during visit today: Not Applicable.    patient requested a visit today to assess right midline. Patient states that the dressing is leaking. Upon my arrival, there is visual drainage underneath the dressing. Writer flushed the midline and it did appear to be leaking. Due to this, the midline was removed. 24 gauge IV started in the left forearm with one attempt. patient has seven doses of antibiotics remaining (per daughter). patient and daughter verbalized understanding to monitor the IV for signs and symptoms of infection and infiltration. Patient and daughter have no further questions for writer. Vital signs.      Heidi Fernández RN 11/30/2024

## 2024-12-01 PROCEDURE — S9502 HIT ANTIBIOTIC Q8H DIEM: HCPCS

## 2024-12-02 ENCOUNTER — HOME CARE VISIT (OUTPATIENT)
Dept: HOME HEALTH SERVICES | Facility: HOME HEALTH | Age: 59
End: 2024-12-02
Payer: COMMERCIAL

## 2024-12-02 ENCOUNTER — HOSPITAL ENCOUNTER (EMERGENCY)
Facility: CLINIC | Age: 59
Discharge: HOME | End: 2024-12-02
Admitting: PHYSICIAN ASSISTANT
Payer: COMMERCIAL

## 2024-12-02 VITALS
RESPIRATION RATE: 18 BRPM | HEART RATE: 111 BPM | DIASTOLIC BLOOD PRESSURE: 88 MMHG | OXYGEN SATURATION: 96 % | TEMPERATURE: 99.5 F | SYSTOLIC BLOOD PRESSURE: 122 MMHG

## 2024-12-02 VITALS
SYSTOLIC BLOOD PRESSURE: 112 MMHG | TEMPERATURE: 97.5 F | OXYGEN SATURATION: 96 % | HEART RATE: 100 BPM | RESPIRATION RATE: 18 BRPM | DIASTOLIC BLOOD PRESSURE: 88 MMHG

## 2024-12-02 DIAGNOSIS — Z01.89 VISIT FOR LABORATORY TEST: ICD-10-CM

## 2024-12-02 LAB
ALBUMIN SERPL BCG-MCNC: 3.8 G/DL (ref 3.5–5.2)
ALP SERPL-CCNC: 116 U/L (ref 40–150)
ALT SERPL W P-5'-P-CCNC: 23 U/L (ref 0–50)
ANION GAP SERPL CALCULATED.3IONS-SCNC: 12 MMOL/L (ref 7–15)
AST SERPL W P-5'-P-CCNC: 26 U/L (ref 0–45)
BASOPHILS # BLD AUTO: 0 10E3/UL (ref 0–0.2)
BASOPHILS NFR BLD AUTO: 0 %
BILIRUB DIRECT SERPL-MCNC: <0.2 MG/DL (ref 0–0.3)
BILIRUB SERPL-MCNC: 0.2 MG/DL
BUN SERPL-MCNC: 10.2 MG/DL (ref 6–20)
CALCIUM SERPL-MCNC: 8.6 MG/DL (ref 8.8–10.4)
CHLORIDE SERPL-SCNC: 107 MMOL/L (ref 98–107)
CREAT SERPL-MCNC: 0.61 MG/DL (ref 0.51–0.95)
EGFRCR SERPLBLD CKD-EPI 2021: >90 ML/MIN/1.73M2
EOSINOPHIL # BLD AUTO: 0.1 10E3/UL (ref 0–0.7)
EOSINOPHIL NFR BLD AUTO: 1 %
ERYTHROCYTE [DISTWIDTH] IN BLOOD BY AUTOMATED COUNT: 12.9 % (ref 10–15)
GLUCOSE SERPL-MCNC: 166 MG/DL (ref 70–99)
HCO3 SERPL-SCNC: 21 MMOL/L (ref 22–29)
HCT VFR BLD AUTO: 38.6 % (ref 35–47)
HGB BLD-MCNC: 12.4 G/DL (ref 11.7–15.7)
IMM GRANULOCYTES # BLD: 0.1 10E3/UL
IMM GRANULOCYTES NFR BLD: 0 %
LYMPHOCYTES # BLD AUTO: 1.7 10E3/UL (ref 0.8–5.3)
LYMPHOCYTES NFR BLD AUTO: 15 %
MAGNESIUM SERPL-MCNC: 2 MG/DL (ref 1.7–2.3)
MCH RBC QN AUTO: 26.8 PG (ref 26.5–33)
MCHC RBC AUTO-ENTMCNC: 32.1 G/DL (ref 31.5–36.5)
MCV RBC AUTO: 84 FL (ref 78–100)
MONOCYTES # BLD AUTO: 0.8 10E3/UL (ref 0–1.3)
MONOCYTES NFR BLD AUTO: 7 %
NEUTROPHILS # BLD AUTO: 8.5 10E3/UL (ref 1.6–8.3)
NEUTROPHILS NFR BLD AUTO: 76 %
NRBC # BLD AUTO: 0 10E3/UL
NRBC BLD AUTO-RTO: 0 /100
PLATELET # BLD AUTO: 350 10E3/UL (ref 150–450)
POTASSIUM SERPL-SCNC: 3.6 MMOL/L (ref 3.4–5.3)
PROT SERPL-MCNC: 7 G/DL (ref 6.4–8.3)
RBC # BLD AUTO: 4.62 10E6/UL (ref 3.8–5.2)
SODIUM SERPL-SCNC: 140 MMOL/L (ref 135–145)
WBC # BLD AUTO: 11.2 10E3/UL (ref 4–11)

## 2024-12-02 PROCEDURE — 99601 HOME NFS VISIT <2 HRS: CPT

## 2024-12-02 PROCEDURE — 82040 ASSAY OF SERUM ALBUMIN: CPT | Performed by: PHYSICIAN ASSISTANT

## 2024-12-02 PROCEDURE — 83735 ASSAY OF MAGNESIUM: CPT | Performed by: PHYSICIAN ASSISTANT

## 2024-12-02 PROCEDURE — 82565 ASSAY OF CREATININE: CPT | Performed by: PHYSICIAN ASSISTANT

## 2024-12-02 PROCEDURE — 82435 ASSAY OF BLOOD CHLORIDE: CPT | Performed by: PHYSICIAN ASSISTANT

## 2024-12-02 PROCEDURE — 85014 HEMATOCRIT: CPT | Performed by: PHYSICIAN ASSISTANT

## 2024-12-02 PROCEDURE — 80048 BASIC METABOLIC PNL TOTAL CA: CPT | Performed by: PHYSICIAN ASSISTANT

## 2024-12-02 PROCEDURE — S9502 HIT ANTIBIOTIC Q8H DIEM: HCPCS

## 2024-12-02 PROCEDURE — 85004 AUTOMATED DIFF WBC COUNT: CPT | Performed by: PHYSICIAN ASSISTANT

## 2024-12-02 PROCEDURE — 82248 BILIRUBIN DIRECT: CPT | Performed by: PHYSICIAN ASSISTANT

## 2024-12-02 PROCEDURE — 36415 COLL VENOUS BLD VENIPUNCTURE: CPT | Performed by: PHYSICIAN ASSISTANT

## 2024-12-02 PROCEDURE — 99283 EMERGENCY DEPT VISIT LOW MDM: CPT

## 2024-12-02 ASSESSMENT — COLUMBIA-SUICIDE SEVERITY RATING SCALE - C-SSRS
1. IN THE PAST MONTH, HAVE YOU WISHED YOU WERE DEAD OR WISHED YOU COULD GO TO SLEEP AND NOT WAKE UP?: NO
2. HAVE YOU ACTUALLY HAD ANY THOUGHTS OF KILLING YOURSELF IN THE PAST MONTH?: NO
6. HAVE YOU EVER DONE ANYTHING, STARTED TO DO ANYTHING, OR PREPARED TO DO ANYTHING TO END YOUR LIFE?: NO

## 2024-12-02 NOTE — PROGRESS NOTES
Nursing Visit Note:  Nurse visit today for lab draw, unable to draw labs, patient dehydrated  for Zoua X Her.     present during visit today: Not Applicable.    Patient and daughter reported patient has not been able to eat or drink much, does not have appetite and has had loose stools. Daughter reported patient has been worried about diarrhea and has not been drinking as much as she should. Daughter agreed to take patient in to [facility] for lab draw and see if she can get iV fluids due to dehydration       Ning Donovan RN 12/2/2024

## 2024-12-02 NOTE — ED TRIAGE NOTES
Pt presents from home for lab draw. Per pt, infusion nurse was unable to get blood work and told pt to come to ED to be assessed for dehydration. Pt is eating and drinking without difficulty. Denies symptoms. ABCs intact.      Triage Assessment (Adult)       Row Name 12/02/24 0444          Triage Assessment    Airway WDL WDL        Respiratory WDL    Respiratory WDL WDL        Skin Circulation/Temperature WDL    Skin Circulation/Temperature WDL WDL        Peripheral/Neurovascular WDL    Peripheral Neurovascular WDL WDL        Cognitive/Neuro/Behavioral WDL    Cognitive/Neuro/Behavioral WDL WDL

## 2024-12-03 ENCOUNTER — HOME INFUSION (OUTPATIENT)
Dept: HOME HEALTH SERVICES | Facility: HOME HEALTH | Age: 59
End: 2024-12-03
Payer: COMMERCIAL

## 2024-12-03 DIAGNOSIS — N39.0 UTI (URINARY TRACT INFECTION): ICD-10-CM

## 2024-12-03 DIAGNOSIS — N12 PYELONEPHRITIS: Primary | ICD-10-CM

## 2024-12-03 NOTE — ED PROVIDER NOTES
EMERGENCY DEPARTMENT ENCOUNTER      NAME: Chicho ONTIVEROS Her  AGE: 59 year old female  YOB: 1965  MRN: [id number]  EVALUATION DATE & TIME: No admission date for patient encounter.    PCP: Bony Mancilla    ED PROVIDER: Franklin Sandoval PA-C      Chief Complaint   Patient presents with    Labs Only         FINAL IMPRESSION:  1. Visit for laboratory test          ED COURSE & MEDICAL DECISION MAKING:    Pertinent Labs & Imaging studies reviewed. (See chart for details)  6:39 PM I met the patient and performed my initial interview and exam.   7:20 PM Labs resulted, patient will be discharged.     59 year old female presents to the Emergency Department for evaluation of labs.     ED Course as of 12/02/24 1920   Mon Dec 02, 2024   1856 Patient is a 59-year-old female, past medical history of hypomagnesia, pyelonephritis, sepsis.  Patient was admitted here on 11/16/2024 for pyelonephritis, grew out ESBL, subsequently on outpatient IV antibiotics.  Was sent in by her home care nurse as they were supposed to draw labs today and they were unable to obtain samples.  Patient denies any acute complaints.  She is mildly tachycardic.  She is afebrile.  She does not have any abdominal pain.  She denies any other acute complaints.  IV still in place, will obtain labs based on orders from outpatient nursing.  Appears that these are just basic labs, for baseline testing.  She otherwise denies any acute complaints here.  Plan for discharge following if labs are appropriate.   1920     Studies here do not show significant abnormalities.  Patient be discharged at this time for ongoing follow-up with outpatient care team.  No evidence of significant dehydration here.       Medical Decision Making  Obtained supplemental history:Supplemental history obtained?: No  Reviewed external records: External records reviewed?: Outpatient Record: Patient ED to hospital admission on 11/16/2024, outpatient health Home visit on 11/30/2024  Care  impacted by chronic illness:Diabetes, Heart Disease, and Other: sepsis,fibromyalgia  Did you consider but not order tests?: Work up considered but not performed and documented in chart, if applicable  Did you interpret images independently?: Independent interpretation of ECG and images noted in documentation, when applicable.  Consultation discussion with other provider:Did you involve another provider (consultant, , pharmacy, etc.)?: No  Discharge. No recommendations on prescription strength medication(s). N/A.    MIPS: Not Applicable      At the conclusion of the encounter I discussed the results of all of the tests and the disposition. The questions were answered. The patient or family acknowledged understanding and was agreeable with the care plan.       0 minutes of critical care time     MEDICATIONS GIVEN IN THE EMERGENCY:  Medications - No data to display    NEW PRESCRIPTIONS STARTED AT TODAY'S ER VISIT  New Prescriptions    No medications on file          =================================================================    HPI    Patient information was obtained from: Patient, Patient Daughter    Use of : N/A       Chicho ONTIVEROS Her is a 59 year old female with a pertinent history of moderate asthma, depression, type 2 diabetes, vitamin D deficiency, fibromyalgia, hypomagnesemia, and sepsis who presents to this ED by walking for evaluation of labs.  Denies any acute complaints today including abdominal pain or fevers.  She was sent in by her primary care doctor/home health care team for repeat baseline laboratory studies which they reportedly could not obtain at home.    Chart review from 11/16/2024 admission: 51-year-old female past med history significant for type 2 diabetes and asthma presented to [facility] with dysuria subsequently found to have a UTI, blood cultures came back positive for ESBL E. coli.  She was evaluated by infectious diseases.  Was treated with meropenem.  Urine culture was  mixture of organisms.  She had midline placed, was supposed to discharge on ertapenem, developed itching hives shortness of breath concerning for allergic reaction, improved with epinephrine Pepcid Benadryl, Solu-Medrol.  Ertapenem discontinued, changed to meropenem and tolerated well.  She is discharging on IV meropenem via midline.     PAST MEDICAL HISTORY:  No past medical history on file.    PAST SURGICAL HISTORY:  Past Surgical History:   Procedure Laterality Date    MIDLINE SINGLE LUMEN PLACEMENT  11/19/2024           CURRENT MEDICATIONS:    acetaminophen (TYLENOL) 500 MG tablet  HYDROcodone-acetaminophen (NORCO) 5-325 MG tablet  meropenem (MERREM) 1 g injection  metFORMIN (GLUCOPHAGE XR) 500 MG 24 hr tablet  sodium chloride, PF, 0.9% PF flush  sodium chloride, PF, 0.9% PF flush         ALLERGIES:  Allergies   Allergen Reactions    Ertapenem Other (See Comments), Hives and Itching     Rapid response called 11/19/24.  Pt received a partial dose of ertapenem, then developed an all over body rash.  (Redness, itching, hives) along with hypertension and chest heaviness.    Tolerates Meropenem         FAMILY HISTORY:  Family History   Problem Relation Age of Onset    No Known Problems Mother     No Known Problems Father     No Known Problems Sister     No Known Problems Daughter     No Known Problems Maternal Grandmother     No Known Problems Maternal Grandfather     No Known Problems Paternal Grandmother     No Known Problems Paternal Grandfather     No Known Problems Maternal Aunt     No Known Problems Paternal Aunt     Hereditary Breast and Ovarian Cancer Syndrome No family hx of     Breast Cancer No family hx of     Cancer No family hx of     Colon Cancer No family hx of     Endometrial Cancer No family hx of     Ovarian Cancer No family hx of        SOCIAL HISTORY:   Social History     Socioeconomic History    Marital status:      Social Drivers of Health     Financial Resource Strain: Low Risk   (11/16/2024)    Financial Resource Strain     Within the past 12 months, have you or your family members you live with been unable to get utilities (heat, electricity) when it was really needed?: No   Food Insecurity: Low Risk  (11/16/2024)    Food Insecurity     Within the past 12 months, did you worry that your food would run out before you got money to buy more?: No     Within the past 12 months, did the food you bought just not last and you didn t have money to get more?: No   Transportation Needs: Low Risk  (11/16/2024)    Transportation Needs     Within the past 12 months, has lack of transportation kept you from medical appointments, getting your medicines, non-medical meetings or appointments, work, or from getting things that you need?: No    Received from [facility], [facility]    Social Connections   Interpersonal Safety: Low Risk  (11/16/2024)    Interpersonal Safety     Do you feel physically and emotionally safe where you currently live?: Yes     Within the past 12 months, have you been hit, slapped, kicked or otherwise physically hurt by someone?: No     Within the past 12 months, have you been humiliated or emotionally abused in other ways by your partner or ex-partner?: No   Housing Stability: Low Risk  (11/16/2024)    Housing Stability     Do you have housing? : Yes     Are you worried about losing your housing?: No       VITALS:  /88   Pulse 111   Temp 99.5  F (37.5  C) (Temporal)   Resp 18   SpO2 96%     PHYSICAL EXAM    Physical Exam  Vitals and nursing note reviewed.   Constitutional:       General: She is not in acute distress.     Appearance: Normal appearance. She is normal weight. She is not toxic-appearing or diaphoretic.   HENT:      Right Ear: External ear normal.      Left Ear: External ear normal.      Mouth/Throat:      Mouth: Mucous membranes are moist.      Pharynx: No oropharyngeal exudate or posterior  oropharyngeal erythema.   Eyes:      Conjunctiva/sclera: Conjunctivae normal.   Cardiovascular:      Rate and Rhythm: Regular rhythm. Tachycardia present.      Pulses: Normal pulses.   Pulmonary:      Effort: Pulmonary effort is normal.   Abdominal:      General: Abdomen is flat.      Palpations: Abdomen is soft.      Tenderness: There is no abdominal tenderness. There is no right CVA tenderness, left CVA tenderness, guarding or rebound.   Musculoskeletal:      Cervical back: Normal range of motion.      Right lower leg: No edema.      Left lower leg: No edema.   Skin:     Findings: No erythema or rash.   Neurological:      Mental Status: She is alert. Mental status is at baseline.           LAB:  All pertinent labs reviewed and interpreted.  Labs Ordered and Resulted from Time of ED Arrival to Time of ED Departure   BASIC METABOLIC PANEL - Abnormal       Result Value    Sodium 140      Potassium 3.6      Chloride 107      Carbon Dioxide (CO2) 21 (*)     Anion Gap 12      Urea Nitrogen 10.2      Creatinine 0.61      GFR Estimate >90      Calcium 8.6 (*)     Glucose 166 (*)    CBC WITH PLATELETS AND DIFFERENTIAL - Abnormal    WBC Count 11.2 (*)     RBC Count 4.62      Hemoglobin 12.4      Hematocrit 38.6      MCV 84      MCH 26.8      MCHC 32.1      RDW 12.9      Platelet Count 350      % Neutrophils 76      % Lymphocytes 15      % Monocytes 7      % Eosinophils 1      % Basophils 0      % Immature Granulocytes 0      NRBCs per 100 WBC 0      Absolute Neutrophils 8.5 (*)     Absolute Lymphocytes 1.7      Absolute Monocytes 0.8      Absolute Eosinophils 0.1      Absolute Basophils 0.0      Absolute Immature Granulocytes 0.1      Absolute NRBCs 0.0     MAGNESIUM - Normal    Magnesium 2.0     HEPATIC FUNCTION PANEL - Normal    Protein Total 7.0      Albumin 3.8      Bilirubin Total 0.2      Alkaline Phosphatase 116      AST 26      ALT 23      Bilirubin Direct <0.20         RADIOLOGY:  Reviewed all pertinent imaging.  Please see official radiology report.  No orders to display         PROCEDURES:   None      I, Mir Benz, am serving as a scribe to document services personally performed by Franklin Sandoval PA-C, based on my observation and the provider's statements to me. I, Franklin Sandoval PA-C, attest that Mir Benz is acting in a scribe capacity, has observed my performance of the services and has documented them in accordance with my direction.    Franklin Sandoval PA-C  Emergency Medicine  [facility] EMERGENCY ROOM  [address] [facility] [address]  [phone]  Dept: [phone]       Franklin Sandoval PA-C  12/02/24 1921

## 2024-12-03 NOTE — DISCHARGE INSTRUCTIONS
You are seen here in the emergency department for evaluation of labs.  Your labs do not show significant evidence of dehydration, worsening infection, or other acute abnormality.  Continue to follow-up for your outpatient treatment with IV antibiotics.    Return to the emergency department if develop new or worsening pain, or worsening symptoms.

## 2024-12-03 NOTE — ED NOTES
Pt evaluated, treated and discharge from lobby. See provider note for assessment. Declining discharge VS.

## 2024-12-04 ENCOUNTER — HOME CARE VISIT (OUTPATIENT)
Dept: HOME HEALTH SERVICES | Facility: HOME HEALTH | Age: 59
End: 2024-12-04
Payer: COMMERCIAL

## 2024-12-04 NOTE — PROGRESS NOTES
Nursing Visit Note:  Nurse visit today for PIV removal for Chicho ONTIVEROS Her.     present during visit today: Not Applicable.    N/A      Faby Gomez RN 12/4/2024

## 2024-12-13 RX ORDER — MEROPENEM 1 G/1
1 INJECTION, POWDER, FOR SOLUTION INTRAVENOUS EVERY 8 HOURS
Qty: 42 EACH | Refills: 0 | Status: DISPENSED | OUTPATIENT
Start: 2024-12-16 | End: 2024-12-31

## 2024-12-16 ENCOUNTER — LAB REQUISITION (OUTPATIENT)
Dept: LAB | Facility: HOSPITAL | Age: 59
End: 2024-12-16
Payer: COMMERCIAL

## 2024-12-16 ENCOUNTER — HOME CARE VISIT (OUTPATIENT)
Dept: HOME HEALTH SERVICES | Facility: HOME HEALTH | Age: 59
End: 2024-12-16
Payer: COMMERCIAL

## 2024-12-16 ENCOUNTER — HOME INFUSION BILLING (OUTPATIENT)
Dept: HOME HEALTH SERVICES | Facility: HOME HEALTH | Age: 59
End: 2024-12-16
Payer: COMMERCIAL

## 2024-12-16 ENCOUNTER — ANCILLARY PROCEDURE (OUTPATIENT)
Dept: OTHER | Facility: HOSPITAL | Age: 59
End: 2024-12-16
Attending: STUDENT IN AN ORGANIZED HEALTH CARE EDUCATION/TRAINING PROGRAM
Payer: COMMERCIAL

## 2024-12-16 VITALS
BODY MASS INDEX: 32.32 KG/M2 | TEMPERATURE: 97.5 F | WEIGHT: 160 LBS | OXYGEN SATURATION: 95 % | RESPIRATION RATE: 16 BRPM | DIASTOLIC BLOOD PRESSURE: 88 MMHG | SYSTOLIC BLOOD PRESSURE: 124 MMHG | HEART RATE: 85 BPM

## 2024-12-16 DIAGNOSIS — N12 TUBULO-INTERSTITIAL NEPHRITIS, NOT SPECIFIED AS ACUTE OR CHRONIC: ICD-10-CM

## 2024-12-16 LAB
ALT SERPL W P-5'-P-CCNC: 22 U/L (ref 0–50)
AST SERPL W P-5'-P-CCNC: 24 U/L (ref 0–45)
BASOPHILS # BLD AUTO: 0 10E3/UL (ref 0–0.2)
BASOPHILS NFR BLD AUTO: 0 %
CREAT SERPL-MCNC: 0.81 MG/DL (ref 0.51–0.95)
EGFRCR SERPLBLD CKD-EPI 2021: 84 ML/MIN/1.73M2
EOSINOPHIL # BLD AUTO: 0.2 10E3/UL (ref 0–0.7)
EOSINOPHIL NFR BLD AUTO: 3 %
ERYTHROCYTE [DISTWIDTH] IN BLOOD BY AUTOMATED COUNT: 13.3 % (ref 10–15)
HCT VFR BLD AUTO: 39.2 % (ref 35–47)
HGB BLD-MCNC: 12.2 G/DL (ref 11.7–15.7)
IMM GRANULOCYTES # BLD: 0 10E3/UL
IMM GRANULOCYTES NFR BLD: 0 %
LYMPHOCYTES # BLD AUTO: 1.3 10E3/UL (ref 0.8–5.3)
LYMPHOCYTES NFR BLD AUTO: 14 %
MCH RBC QN AUTO: 26.4 PG (ref 26.5–33)
MCHC RBC AUTO-ENTMCNC: 31.1 G/DL (ref 31.5–36.5)
MCV RBC AUTO: 85 FL (ref 78–100)
MONOCYTES # BLD AUTO: 1 10E3/UL (ref 0–1.3)
MONOCYTES NFR BLD AUTO: 11 %
NEUTROPHILS # BLD AUTO: 6.4 10E3/UL (ref 1.6–8.3)
NEUTROPHILS NFR BLD AUTO: 72 %
NRBC # BLD AUTO: 0 10E3/UL
NRBC BLD AUTO-RTO: 0 /100
PLATELET # BLD AUTO: 240 10E3/UL (ref 150–450)
RBC # BLD AUTO: 4.62 10E6/UL (ref 3.8–5.2)
WBC # BLD AUTO: 9 10E3/UL (ref 4–11)

## 2024-12-16 PROCEDURE — 85004 AUTOMATED DIFF WBC COUNT: CPT | Performed by: STUDENT IN AN ORGANIZED HEALTH CARE EDUCATION/TRAINING PROGRAM

## 2024-12-16 PROCEDURE — S9502 HIT ANTIBIOTIC Q8H DIEM: HCPCS

## 2024-12-16 PROCEDURE — A4215 STERILE NEEDLE: HCPCS

## 2024-12-16 PROCEDURE — A4452 WATERPROOF TAPE: HCPCS

## 2024-12-16 PROCEDURE — A4245 ALCOHOL WIPES PER BOX: HCPCS

## 2024-12-16 PROCEDURE — 82565 ASSAY OF CREATININE: CPT | Performed by: STUDENT IN AN ORGANIZED HEALTH CARE EDUCATION/TRAINING PROGRAM

## 2024-12-16 PROCEDURE — 84450 TRANSFERASE (AST) (SGOT): CPT | Performed by: STUDENT IN AN ORGANIZED HEALTH CARE EDUCATION/TRAINING PROGRAM

## 2024-12-16 PROCEDURE — 272N000748 HC KIT, CATH 3FR OR 4FR SINGLE LUMEN POWERMIDLINE

## 2024-12-16 PROCEDURE — A4213 20+ CC SYRINGE ONLY: HCPCS

## 2024-12-16 PROCEDURE — 36569 INSJ PICC 5 YR+ W/O IMAGING: CPT

## 2024-12-16 PROCEDURE — 84460 ALANINE AMINO (ALT) (SGPT): CPT | Performed by: STUDENT IN AN ORGANIZED HEALTH CARE EDUCATION/TRAINING PROGRAM

## 2024-12-16 PROCEDURE — A9270 NON-COVERED ITEM OR SERVICE: HCPCS

## 2024-12-16 NOTE — PROCEDURES
Midline Insertion Procedure Note  Pt. Name: Chicho Jin  MRN:        [id number]      Procedure: Insertion of 4Fr  Bard Power Midline Catheter, Lot number [id number]    Indications: antibiotics    Procedure Details   Patient identified with 2 identifiers.  Contraindications reviewed: none noted.     Hand hygeine performed prior to procedure, site cleansed with cholraprep, mask and sterile gloves worn, patient's arm draped with sterile fenestrated drape, sterile field maintained.    2 ml 1% Lidocaine administered sq to the insertion site. 4 g midline catheter inserted into the BASILIC vein of the left arm with ultrasound guidance. 1 attempts required to access vein.   Catheter threaded without difficulty. Good blood return noted.    Modified Seldinger Technique used for insertion.    Catheter secured with Statlock, biopatch and Tegaderm dressing applied.    Findings:  Total catheter length  15 cm, with 0 cm exposed. Mid upper arm circumference is 30 cm. Catheter was flushed with 10 cc NS. Patient  tolerated procedure well.      Patient's primary RN notified midline is ready for use.    Comments:      A midline catheter is a form of peripheral venous access. Not recommended for the infusion of vesicants (Vancomycin, Vasopressors, TPN, etc.)    Bibiana Sanchez RN PICC

## 2024-12-17 PROCEDURE — S9502 HIT ANTIBIOTIC Q8H DIEM: HCPCS

## 2024-12-17 NOTE — PROGRESS NOTES
Nursing Visit Note:  Nurse visit today for SOC, labs, teaching/reinforcement of IVP antibiotics for Zoearl X Her.     present during visit today: Not Applicable.     Education Provided:     Patient Education focused on IV Antibiotic administration (IVP) and midline care/maintenance. Patient recently on service with [facility]. Patient/caregiver state same antibiotic also. Patient/caregiver verbalized understanding of IV antibiotic preparation and administration. daughter able to demonstrate with good technique/procedure. Deny need for further teaching..     Saline administered (in mLs): 20    Next visit plan CLC and labs in one week 12/23/24.       Roman Foster RN 12/16/24   , Lab-Only Nursing Note    Labs obtained via VPT    ONTIME Tracking number: [id number]    Facility sent to: [facility]    Saline administered (in mLs): 20    Next Visit Plan:   CLC and labs in one week 12/23/24    Roman Foster RN 12/16/2024  , and patient alert and oriented. Sitting on couch with daughter. NAD. VSS. midline dressing C/D/I. Applied extension set. Daughter able to prepare and administer IVP antibiotic independently. Patient denies pain or shortness of breath. Some nausea but no vomiting. x 4 to 5 loose stools per day. denies issues with urination other than dark/Dory urine. Encouraged to call with questions/concern/updates.      Roman Foster RN 12/16/2024

## 2024-12-18 ENCOUNTER — HOME INFUSION BILLING (OUTPATIENT)
Dept: HOME HEALTH SERVICES | Facility: HOME HEALTH | Age: 59
End: 2024-12-18
Payer: COMMERCIAL

## 2024-12-18 PROCEDURE — A4213 20+ CC SYRINGE ONLY: HCPCS

## 2024-12-18 PROCEDURE — A9270 NON-COVERED ITEM OR SERVICE: HCPCS

## 2024-12-18 PROCEDURE — S9502 HIT ANTIBIOTIC Q8H DIEM: HCPCS

## 2024-12-18 PROCEDURE — A4215 STERILE NEEDLE: HCPCS

## 2024-12-19 PROCEDURE — S9502 HIT ANTIBIOTIC Q8H DIEM: HCPCS

## 2024-12-20 PROCEDURE — S9502 HIT ANTIBIOTIC Q8H DIEM: HCPCS

## 2024-12-21 PROCEDURE — S9502 HIT ANTIBIOTIC Q8H DIEM: HCPCS

## 2024-12-22 PROCEDURE — S9502 HIT ANTIBIOTIC Q8H DIEM: HCPCS

## 2024-12-23 ENCOUNTER — HOME CARE VISIT (OUTPATIENT)
Dept: HOME HEALTH SERVICES | Facility: HOME HEALTH | Age: 59
End: 2024-12-23
Payer: COMMERCIAL

## 2024-12-23 ENCOUNTER — LAB REQUISITION (OUTPATIENT)
Dept: LAB | Facility: CLINIC | Age: 59
End: 2024-12-23
Payer: COMMERCIAL

## 2024-12-23 VITALS
TEMPERATURE: 97.8 F | HEART RATE: 88 BPM | RESPIRATION RATE: 16 BRPM | OXYGEN SATURATION: 96 % | DIASTOLIC BLOOD PRESSURE: 82 MMHG | SYSTOLIC BLOOD PRESSURE: 136 MMHG

## 2024-12-23 DIAGNOSIS — N39.0 URINARY TRACT INFECTION, SITE NOT SPECIFIED: ICD-10-CM

## 2024-12-23 LAB
ALT SERPL W P-5'-P-CCNC: 33 U/L (ref 0–50)
AST SERPL W P-5'-P-CCNC: 32 U/L (ref 0–45)
BASOPHILS # BLD AUTO: 0 10E3/UL (ref 0–0.2)
BASOPHILS NFR BLD AUTO: 0 %
CREAT SERPL-MCNC: 0.75 MG/DL (ref 0.51–0.95)
EGFRCR SERPLBLD CKD-EPI 2021: >90 ML/MIN/1.73M2
EOSINOPHIL # BLD AUTO: 0.2 10E3/UL (ref 0–0.7)
EOSINOPHIL NFR BLD AUTO: 2 %
ERYTHROCYTE [DISTWIDTH] IN BLOOD BY AUTOMATED COUNT: 13.3 % (ref 10–15)
HCT VFR BLD AUTO: 38.3 % (ref 35–47)
HGB BLD-MCNC: 12 G/DL (ref 11.7–15.7)
HOLD SPECIMEN: NORMAL
IMM GRANULOCYTES # BLD: 0 10E3/UL
IMM GRANULOCYTES NFR BLD: 0 %
LYMPHOCYTES # BLD AUTO: 1.6 10E3/UL (ref 0.8–5.3)
LYMPHOCYTES NFR BLD AUTO: 19 %
MCH RBC QN AUTO: 26.7 PG (ref 26.5–33)
MCHC RBC AUTO-ENTMCNC: 31.3 G/DL (ref 31.5–36.5)
MCV RBC AUTO: 85 FL (ref 78–100)
MONOCYTES # BLD AUTO: 0.4 10E3/UL (ref 0–1.3)
MONOCYTES NFR BLD AUTO: 5 %
NEUTROPHILS # BLD AUTO: 6 10E3/UL (ref 1.6–8.3)
NEUTROPHILS NFR BLD AUTO: 73 %
NRBC # BLD AUTO: 0 10E3/UL
NRBC BLD AUTO-RTO: 0 /100
PLATELET # BLD AUTO: 253 10E3/UL (ref 150–450)
RBC # BLD AUTO: 4.5 10E6/UL (ref 3.8–5.2)
WBC # BLD AUTO: 8.1 10E3/UL (ref 4–11)

## 2024-12-23 PROCEDURE — 84450 TRANSFERASE (AST) (SGOT): CPT | Performed by: STUDENT IN AN ORGANIZED HEALTH CARE EDUCATION/TRAINING PROGRAM

## 2024-12-23 PROCEDURE — 85041 AUTOMATED RBC COUNT: CPT | Performed by: STUDENT IN AN ORGANIZED HEALTH CARE EDUCATION/TRAINING PROGRAM

## 2024-12-23 PROCEDURE — 85004 AUTOMATED DIFF WBC COUNT: CPT | Performed by: STUDENT IN AN ORGANIZED HEALTH CARE EDUCATION/TRAINING PROGRAM

## 2024-12-23 PROCEDURE — 84460 ALANINE AMINO (ALT) (SGPT): CPT | Performed by: STUDENT IN AN ORGANIZED HEALTH CARE EDUCATION/TRAINING PROGRAM

## 2024-12-23 PROCEDURE — S9502 HIT ANTIBIOTIC Q8H DIEM: HCPCS

## 2024-12-23 PROCEDURE — 82565 ASSAY OF CREATININE: CPT | Performed by: STUDENT IN AN ORGANIZED HEALTH CARE EDUCATION/TRAINING PROGRAM

## 2024-12-23 NOTE — PROGRESS NOTES
Lab-Only Nursing Note    Labs obtained via VPT    ONTIME Tracking number: [id number]    Facility sent to: [facility]    Saline administered (in mLs): n/a    Next Visit Plan:   12/30/24 clc labs and assessment     Kriss Hughes, GERARDO 12/23/2024

## 2024-12-23 NOTE — PROGRESS NOTES
Nursing Visit Note:  Nurse visit today for CLC, Labs, and GA. CLC done per [facility] protocol. Midline flushes well. Ecchymosis noted lateral to CL site and scattered bruises noted on pt s arms from previous lab draws/PIV attempts. Pt reports nausea is improved. Still having loose stools but not actual diarrhea. Fluid intake adequate. Writer unable to obtain ordered labs via peripheral stick (attempted x3). Another RN will draw pt s weekly labs later today.  for Chicho X .     present during visit today: Not Applicable.    N/A      Rc Weinstein RN 12/23/2024

## 2024-12-24 PROCEDURE — S9502 HIT ANTIBIOTIC Q8H DIEM: HCPCS

## 2024-12-25 PROCEDURE — S9502 HIT ANTIBIOTIC Q8H DIEM: HCPCS

## 2024-12-26 ENCOUNTER — HOME INFUSION BILLING (OUTPATIENT)
Dept: HOME HEALTH SERVICES | Facility: HOME HEALTH | Age: 59
End: 2024-12-26
Payer: COMMERCIAL

## 2024-12-26 PROCEDURE — A4213 20+ CC SYRINGE ONLY: HCPCS

## 2024-12-26 PROCEDURE — A4215 STERILE NEEDLE: HCPCS

## 2024-12-26 PROCEDURE — S9502 HIT ANTIBIOTIC Q8H DIEM: HCPCS

## 2024-12-26 PROCEDURE — A9270 NON-COVERED ITEM OR SERVICE: HCPCS

## 2024-12-27 PROCEDURE — S9502 HIT ANTIBIOTIC Q8H DIEM: HCPCS

## 2024-12-28 PROCEDURE — S9502 HIT ANTIBIOTIC Q8H DIEM: HCPCS

## 2024-12-29 PROCEDURE — S9502 HIT ANTIBIOTIC Q8H DIEM: HCPCS

## 2024-12-30 ENCOUNTER — LAB REQUISITION (OUTPATIENT)
Dept: LAB | Facility: HOSPITAL | Age: 59
End: 2024-12-30
Payer: COMMERCIAL

## 2024-12-30 ENCOUNTER — TELEPHONE (OUTPATIENT)
Dept: HOME HEALTH SERVICES | Facility: HOME HEALTH | Age: 59
End: 2024-12-30

## 2024-12-30 ENCOUNTER — HOME CARE VISIT (OUTPATIENT)
Dept: HOME HEALTH SERVICES | Facility: HOME HEALTH | Age: 59
End: 2024-12-30
Payer: COMMERCIAL

## 2024-12-30 VITALS
SYSTOLIC BLOOD PRESSURE: 136 MMHG | RESPIRATION RATE: 16 BRPM | OXYGEN SATURATION: 94 % | TEMPERATURE: 97.3 F | HEART RATE: 96 BPM | DIASTOLIC BLOOD PRESSURE: 70 MMHG

## 2024-12-30 LAB
ALT SERPL W P-5'-P-CCNC: 27 U/L (ref 0–50)
AST SERPL W P-5'-P-CCNC: 24 U/L (ref 0–45)
BASOPHILS # BLD AUTO: 0 10E3/UL (ref 0–0.2)
BASOPHILS NFR BLD AUTO: 0 %
CREAT SERPL-MCNC: 0.61 MG/DL (ref 0.51–0.95)
EGFRCR SERPLBLD CKD-EPI 2021: >90 ML/MIN/1.73M2
EOSINOPHIL # BLD AUTO: 0.2 10E3/UL (ref 0–0.7)
EOSINOPHIL NFR BLD AUTO: 2 %
ERYTHROCYTE [DISTWIDTH] IN BLOOD BY AUTOMATED COUNT: 13.3 % (ref 10–15)
HCT VFR BLD AUTO: 38.3 % (ref 35–47)
HGB BLD-MCNC: 12 G/DL (ref 11.7–15.7)
HOLD SPECIMEN: NORMAL
IMM GRANULOCYTES # BLD: 0 10E3/UL
IMM GRANULOCYTES NFR BLD: 0 %
LYMPHOCYTES # BLD AUTO: 1.4 10E3/UL (ref 0.8–5.3)
LYMPHOCYTES NFR BLD AUTO: 16 %
MCH RBC QN AUTO: 26.2 PG (ref 26.5–33)
MCHC RBC AUTO-ENTMCNC: 31.3 G/DL (ref 31.5–36.5)
MCV RBC AUTO: 84 FL (ref 78–100)
MONOCYTES # BLD AUTO: 0.9 10E3/UL (ref 0–1.3)
MONOCYTES NFR BLD AUTO: 10 %
NEUTROPHILS # BLD AUTO: 6.5 10E3/UL (ref 1.6–8.3)
NEUTROPHILS NFR BLD AUTO: 71 %
NRBC # BLD AUTO: 0 10E3/UL
NRBC BLD AUTO-RTO: 0 /100
PLATELET # BLD AUTO: 280 10E3/UL (ref 150–450)
RBC # BLD AUTO: 4.58 10E6/UL (ref 3.8–5.2)
WBC # BLD AUTO: 9.1 10E3/UL (ref 4–11)

## 2024-12-30 PROCEDURE — 99602 HOME NFS VISIT EACH ADDL HR: CPT

## 2024-12-30 PROCEDURE — S9502 HIT ANTIBIOTIC Q8H DIEM: HCPCS

## 2024-12-30 PROCEDURE — 84450 TRANSFERASE (AST) (SGOT): CPT | Performed by: STUDENT IN AN ORGANIZED HEALTH CARE EDUCATION/TRAINING PROGRAM

## 2024-12-30 PROCEDURE — 85004 AUTOMATED DIFF WBC COUNT: CPT | Performed by: STUDENT IN AN ORGANIZED HEALTH CARE EDUCATION/TRAINING PROGRAM

## 2024-12-30 PROCEDURE — 82565 ASSAY OF CREATININE: CPT | Performed by: STUDENT IN AN ORGANIZED HEALTH CARE EDUCATION/TRAINING PROGRAM

## 2024-12-30 PROCEDURE — 84460 ALANINE AMINO (ALT) (SGPT): CPT | Performed by: STUDENT IN AN ORGANIZED HEALTH CARE EDUCATION/TRAINING PROGRAM

## 2024-12-30 PROCEDURE — 99601 HOME NFS VISIT <2 HRS: CPT

## 2024-12-30 PROCEDURE — 85018 HEMOGLOBIN: CPT | Performed by: STUDENT IN AN ORGANIZED HEALTH CARE EDUCATION/TRAINING PROGRAM

## 2024-12-30 NOTE — TELEPHONE ENCOUNTER
Called in looking for more supplies, has drug thru 10pm tomorrow evening dose.  In reviewing, RN seeing patient at 3:00pm to pull PICC line.  Med orders state 14 days, going thru 12/31.     Daughter states Dr. Figueroa was going to order antx for 3 weeks as UTI did not clear after 2 weeks last time, however, his note from 12/13 states 14 days.   Message to [facility] to verify LOT.    Blanca Mccarthy RN on 12/30/2024 at 2:56 PM

## 2024-12-30 NOTE — PROGRESS NOTES
Lab-Only Nursing Note    Labs obtained via PIV US nurse Roman placed line    ONTIME Tracking number: [id number]    Facility sent to: [facility]    Saline administered (in mLs): NA    Next Visit Plan:   Rn revisit for midline pull and GA     Ning Donovan RN 12/30/2024

## 2024-12-31 PROCEDURE — S9502 HIT ANTIBIOTIC Q8H DIEM: HCPCS

## 2025-01-07 ENCOUNTER — OFFICE VISIT (OUTPATIENT)
Dept: INFECTIOUS DISEASES | Facility: CLINIC | Age: 60
End: 2025-01-07
Payer: COMMERCIAL

## 2025-01-07 VITALS
WEIGHT: 164.9 LBS | HEART RATE: 86 BPM | DIASTOLIC BLOOD PRESSURE: 110 MMHG | TEMPERATURE: 98.4 F | BODY MASS INDEX: 33.31 KG/M2 | SYSTOLIC BLOOD PRESSURE: 164 MMHG | OXYGEN SATURATION: 97 %

## 2025-01-07 DIAGNOSIS — N39.0 RECURRENT UTI: Primary | ICD-10-CM

## 2025-01-07 DIAGNOSIS — R78.81 E COLI BACTEREMIA: ICD-10-CM

## 2025-01-07 DIAGNOSIS — B96.20 E COLI BACTEREMIA: ICD-10-CM

## 2025-01-07 RX ORDER — CYANOCOBALAMIN 1000 UG/ML
1000 INJECTION, SOLUTION INTRAMUSCULAR; SUBCUTANEOUS
COMMUNITY
Start: 2024-11-15

## 2025-01-07 RX ORDER — ALBUTEROL SULFATE 90 UG/1
1-2 INHALANT RESPIRATORY (INHALATION)
COMMUNITY
Start: 2023-07-10

## 2025-01-07 RX ORDER — BLOOD-GLUCOSE METER
KIT MISCELLANEOUS
COMMUNITY
Start: 2024-02-20

## 2025-01-07 NOTE — PROGRESS NOTES
[facility] post-hospital stay follow up.    Name: Chicho Jin  :   1965  MRN:   [id number]  PCP:    Bony Mancilla  DOS:    25      CC: Post Hospital Stay follow up for recurrent UTI    HPI/Interval History:  Chicho ONTIVEROS Her is a 59 year old old female with the history of DM2, obesity who was admitted to the hospital with ESBL E. coli bacteremia.  She was initially treated with meropenem but then developed severe reaction when changed to ertapenem with body rash, chest heaviness, hypertension within half hour of the first infusion of ertapenem.  She was then switched back to meropenem which she tolerated well.  This was in 2024.  She completed 14 days of the IV meropenem outpatient.  Within 2 weeks of completion of therapy, her symptoms recurred and her urine grew the same bacteria at her primary care clinic.  She just completed another 14-day course of IV meropenem on 2024.  In clinic today, the patient is doing well.  She denies any symptoms of UTI.  She feels back to normal.    ===========================  Past Medical History:  Obesity  Diabetes mellitus    Past Surgical History:  Past Surgical History:   Procedure Laterality Date    MIDLINE SINGLE LUMEN PLACEMENT  2024    MIDLINE SINGLE LUMEN PLACEMENT  2024       Social History:  Social History     Socioeconomic History    Marital status:      Spouse name: Not on file    Number of children: Not on file    Years of education: Not on file    Highest education level: Not on file   Occupational History    Not on file   Tobacco Use    Smoking status: Not on file    Smokeless tobacco: Not on file   Substance and Sexual Activity    Alcohol use: Not on file    Drug use: Not on file    Sexual activity: Not on file   Other Topics Concern    Not on file   Social History Narrative    Not on file     Social Drivers of Health     Financial Resource Strain: Low Risk  (2024)    Financial Resource Strain     Within the past  12 months, have you or your family members you live with been unable to get utilities (heat, electricity) when it was really needed?: No   Food Insecurity: Low Risk  (11/16/2024)    Food Insecurity     Within the past 12 months, did you worry that your food would run out before you got money to buy more?: No     Within the past 12 months, did the food you bought just not last and you didn t have money to get more?: No   Transportation Needs: Low Risk  (11/16/2024)    Transportation Needs     Within the past 12 months, has lack of transportation kept you from medical appointments, getting your medicines, non-medical meetings or appointments, work, or from getting things that you need?: No   Physical Activity: Not on file   Stress: Not on file   Social Connections: Unknown (11/10/2023)    Received from [facility]    Social Connections     Frequency of Communication with Friends and Family: Not on file   Interpersonal Safety: Low Risk  (11/16/2024)    Interpersonal Safety     Do you feel physically and emotionally safe where you currently live?: Yes     Within the past 12 months, have you been hit, slapped, kicked or otherwise physically hurt by someone?: No     Within the past 12 months, have you been humiliated or emotionally abused in other ways by your partner or ex-partner?: No   Housing Stability: Low Risk  (11/16/2024)    Housing Stability     Do you have housing? : Yes     Are you worried about losing your housing?: No       Family Medical History:  Family History   Problem Relation Age of Onset    No Known Problems Mother     No Known Problems Father     No Known Problems Sister     No Known Problems Daughter     No Known Problems Maternal Grandmother     No Known Problems Maternal Grandfather     No Known Problems Paternal Grandmother     No Known Problems Paternal Grandfather     No Known Problems Maternal Aunt     No Known  Problems Paternal Aunt     Hereditary Breast and Ovarian Cancer Syndrome No family hx of     Breast Cancer No family hx of     Cancer No family hx of     Colon Cancer No family hx of     Endometrial Cancer No family hx of     Ovarian Cancer No family hx of        Allergies:     Allergies   Allergen Reactions    Ertapenem Other (See Comments), Hives and Itching     Rapid response called 11/19/24.  Pt received a partial dose of ertapenem, then developed an all over body rash.  (Redness, itching, hives) along with hypertension and chest heaviness.    Tolerates Meropenem         Medications:  Current Outpatient Medications   Medication Sig Dispense Refill    acetaminophen (TYLENOL) 500 MG tablet Take 1,000 mg by mouth every 6 hours as needed for mild pain.      albuterol (PROAIR HFA/PROVENTIL HFA/VENTOLIN HFA) 108 (90 Base) MCG/ACT inhaler Inhale 1-2 puffs into the lungs.      Blood Glucose Monitoring Suppl (ASSURE PRO BLOOD GLUCOSE METER) JOSETTE Dispense meter covered by pt ins. E11.9 NIDDM type II - Test 1 time/day      cyanocobalamin (CYANOCOBALAMIN) 1000 mcg/mL injection Inject 1,000 mcg into the muscle.      metFORMIN (GLUCOPHAGE XR) 500 MG 24 hr tablet Take 1,000 mg by mouth 2 times daily (with meals).         Immunizations:  Immunization History   Administered Date(s) Administered    COVID-19 12+ (MODERNA) 02/20/2024    COVID-19 12+ (Pfizer) 11/08/2024    COVID-19 Bivalent 18+ (Moderna) 12/01/2022    COVID-19 Monovalent 18+ (Moderna) 12/27/2021    COVID-19 Vaccine (Saroj) 04/02/2021       ==================    Review of System:  12 points review of system is negative except for findings in the HPI.    Exam  VS: BP (!) 164/110 (BP Location: Left arm)   Pulse 86   Temp 98.4  F (36.9  C) (Oral)   Wt 74.8 kg (164 lb 14.4 oz)   SpO2 97%   BMI 33.31 kg/m      Gen: Pleasant in no acute distress.  HEENT: NCAT. EOMI.  Neck: No LAD.  Lungs: Clear to auscultation bilaterally with no crackles or wheezes.   Card: RRR. No  RMG. Peripheral pulses present and symmetrical. No edema.   Abd: Soft NT ND. No hepatomegaly or splenomegaly.  Ext: No edema  Lymph: No cervical or supraclavicular adenopathy.  Skin: No rash  Neuro: Alert and oriented to place time and person. Cranial nerves grossly intact.     Labs:  Reviewed    Imaging:  Reviewed    Assessment:  Chicho ONTIVEROS Her is a 59 year old old female with 2 episodes of ESBL E. coli UTI complicated with bacteremia during the first episode.  Completed IV meropenem on 12/31/2024.  Currently asymptomatic.  She has no known stone or obstructive disease.      Recommendations:  No additional antibiotic needed at this point.  Will obtain CT scan of the abdomen and pelvis ruling out any obstructive pathology.  Follow-up as needed.      Keturah Figueroa MD  [facility]  [facility]  Office Telephone [phone].  Fax [phone]  [facility] paging

## 2025-01-15 ENCOUNTER — HOSPITAL ENCOUNTER (OUTPATIENT)
Dept: CT IMAGING | Facility: HOSPITAL | Age: 60
Discharge: HOME | End: 2025-01-15
Attending: STUDENT IN AN ORGANIZED HEALTH CARE EDUCATION/TRAINING PROGRAM
Payer: COMMERCIAL

## 2025-01-15 DIAGNOSIS — R78.81 E COLI BACTEREMIA: ICD-10-CM

## 2025-01-15 DIAGNOSIS — N39.0 RECURRENT UTI: ICD-10-CM

## 2025-01-15 DIAGNOSIS — B96.20 E COLI BACTEREMIA: ICD-10-CM

## 2025-01-15 LAB
CREAT BLD-MCNC: 0.9 MG/DL (ref 0.6–1.1)
EGFRCR SERPLBLD CKD-EPI 2021: >60 ML/MIN/1.73M2

## 2025-01-15 PROCEDURE — 250N000011 HC RX IP 250 OP 636: Performed by: STUDENT IN AN ORGANIZED HEALTH CARE EDUCATION/TRAINING PROGRAM

## 2025-01-15 PROCEDURE — 82565 ASSAY OF CREATININE: CPT

## 2025-01-15 PROCEDURE — 74177 CT ABD & PELVIS W/CONTRAST: CPT

## 2025-01-15 PROCEDURE — 999N000248 HC STATISTIC IV INSERT WITH US BY RN

## 2025-01-15 RX ORDER — IOPAMIDOL 755 MG/ML
81 INJECTION, SOLUTION INTRAVASCULAR ONCE
Status: COMPLETED | OUTPATIENT
Start: 2025-01-15 | End: 2025-01-15

## 2025-01-15 RX ADMIN — IOPAMIDOL 81 ML: 755 INJECTION, SOLUTION INTRAVENOUS at 14:28

## 2025-01-17 ENCOUNTER — TELEPHONE (OUTPATIENT)
Dept: INFECTIOUS DISEASES | Facility: CLINIC | Age: 60
End: 2025-01-17
Payer: COMMERCIAL

## 2025-01-17 NOTE — TELEPHONE ENCOUNTER
[facility]    Phone Message    May a detailed message be left on voicemail: yes     Reason for Call: Requesting Results.     Patient's daughter called to see if care team can review the results of test and call her back to let know if Chicho needs to continue on disability or return to work next month. She would like a call back at [phone].    Name/type of test: CT ABDOMEN PELVIS W CONTRAST  Date of test: 1/15/25  Was test done at a location other than [facility] (Please fill in the location if not [facility])?: No    Action Taken: Message routed to:  Other: ID    Travel Screening: Not Applicable     Date of Service: 1/17/25

## 2025-01-17 NOTE — TELEPHONE ENCOUNTER
"LOV 1/7/25:  Assessment:  Chicho ONTIVEROS Her is a 59 year old old female with 2 episodes of ESBL E. coli UTI complicated with bacteremia during the first episode.  Completed IV meropenem on 12/31/2024.  Currently asymptomatic.  She has no known stone or obstructive disease.  Recommendations:  No additional antibiotic needed at this point.  Will obtain CT scan of the abdomen and pelvis ruling out any obstructive pathology.  Follow-up as needed.    IMPRESSION:   1.  No identifiable CT abnormality to account for patient's recurrent urinary tract infections.  2.  Hepatic steatosis.  3.  Colonic diverticulosis.  4.  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  "

## 2025-01-20 NOTE — TELEPHONE ENCOUNTER
No C2C on file for daughter-Per provider- No findings on CT for obstruction to account for     MC sent to pt

## 2025-03-15 ENCOUNTER — HEALTH MAINTENANCE LETTER (OUTPATIENT)
Age: 60
End: 2025-03-15

## 2025-04-27 ENCOUNTER — HEALTH MAINTENANCE LETTER (OUTPATIENT)
Age: 60
End: 2025-04-27

## 2025-06-14 ENCOUNTER — HOSPITAL ENCOUNTER (EMERGENCY)
Facility: CLINIC | Age: 60
Discharge: HOME | End: 2025-06-14
Attending: EMERGENCY MEDICINE | Admitting: EMERGENCY MEDICINE
Payer: COMMERCIAL

## 2025-06-14 VITALS
OXYGEN SATURATION: 98 % | WEIGHT: 166 LBS | RESPIRATION RATE: 16 BRPM | BODY MASS INDEX: 33.53 KG/M2 | HEART RATE: 65 BPM | DIASTOLIC BLOOD PRESSURE: 80 MMHG | TEMPERATURE: 97.8 F | SYSTOLIC BLOOD PRESSURE: 172 MMHG

## 2025-06-14 DIAGNOSIS — N30.90 CYSTITIS: ICD-10-CM

## 2025-06-14 LAB
ALBUMIN UR-MCNC: NEGATIVE MG/DL
APPEARANCE UR: CLEAR
BILIRUB UR QL STRIP: NEGATIVE
COLOR UR AUTO: COLORLESS
GLUCOSE UR STRIP-MCNC: NEGATIVE MG/DL
HGB UR QL STRIP: ABNORMAL
KETONES UR STRIP-MCNC: NEGATIVE MG/DL
LEUKOCYTE ESTERASE UR QL STRIP: ABNORMAL
NITRATE UR QL: NEGATIVE
PH UR STRIP: 6.5 [PH] (ref 5–7)
RBC URINE: <1 /HPF
SP GR UR STRIP: 1 (ref 1–1.03)
UROBILINOGEN UR STRIP-MCNC: NORMAL MG/DL
WBC URINE: <1 /HPF

## 2025-06-14 PROCEDURE — 87086 URINE CULTURE/COLONY COUNT: CPT | Performed by: EMERGENCY MEDICINE

## 2025-06-14 PROCEDURE — 81003 URINALYSIS AUTO W/O SCOPE: CPT | Performed by: EMERGENCY MEDICINE

## 2025-06-14 PROCEDURE — 99283 EMERGENCY DEPT VISIT LOW MDM: CPT

## 2025-06-14 RX ORDER — NITROFURANTOIN 25; 75 MG/1; MG/1
100 CAPSULE ORAL 2 TIMES DAILY
Qty: 20 CAPSULE | Refills: 0 | Status: SHIPPED | OUTPATIENT
Start: 2025-06-14

## 2025-06-14 ASSESSMENT — ENCOUNTER SYMPTOMS
FREQUENCY: 1
HEMATURIA: 0
FATIGUE: 1
DYSURIA: 1

## 2025-06-14 ASSESSMENT — COLUMBIA-SUICIDE SEVERITY RATING SCALE - C-SSRS
6. HAVE YOU EVER DONE ANYTHING, STARTED TO DO ANYTHING, OR PREPARED TO DO ANYTHING TO END YOUR LIFE?: NO
1. IN THE PAST MONTH, HAVE YOU WISHED YOU WERE DEAD OR WISHED YOU COULD GO TO SLEEP AND NOT WAKE UP?: NO
2. HAVE YOU ACTUALLY HAD ANY THOUGHTS OF KILLING YOURSELF IN THE PAST MONTH?: NO

## 2025-06-14 NOTE — ED TRIAGE NOTES
Pt arrives to ED with c/o UTI symptoms of dysuria, urinary frequency, urgency and urinary retention. Pt endorses it started last week (Wednesday) but then resolved and came back last night. Hx of UTIs and long term antibiotics.      Triage Assessment (Adult)       Row Name 06/14/25 0857          Triage Assessment    Airway WDL WDL        Respiratory WDL    Respiratory WDL WDL        Skin Circulation/Temperature WDL    Skin Circulation/Temperature WDL WDL        Cardiac WDL    Cardiac WDL WDL        Peripheral/Neurovascular WDL    Peripheral Neurovascular WDL WDL        Cognitive/Neuro/Behavioral WDL    Cognitive/Neuro/Behavioral WDL WDL

## 2025-06-14 NOTE — DISCHARGE INSTRUCTIONS
Recheck with your doctor next week.  Recommend Macrobid twice a day for 10 days.  If any worsening symptoms return immediately to the ER based on your previous history

## 2025-06-14 NOTE — ED PROVIDER NOTES
EMERGENCY DEPARTMENT ENCOUNTER      NAME: Chicho ONTIVEROS Her  AGE: 60 year old female  YOB: 1965  MRN: [id number]  EVALUATION DATE & TIME: No admission date for patient encounter.    PCP: Bony Mancilla    ED PROVIDER: Home Sosa MD      Chief Complaint   Patient presents with    Dysuria    Urinary Frequency    Urinary Retention         FINAL IMPRESSION:  1. Cystitis          ED COURSE & MEDICAL DECISION MAKING:    Pertinent Labs & Imaging studies reviewed. (See chart for details)  60 year old female presents to the Emergency Department for evaluation of dysuria and frequency.  On exam well-appearing.  No fever.  No abdominal tenderness.  No CVA tenderness    Likely cystitis.  History of ESBL.  Reviewed last urine culture from December sensitive to Macrobid.  Doubt pyelonephritis.  Discussed with patient and patient's daughter if any worsening symptoms need immediately return to the ER.  Otherwise recommend Macrobid twice a day and follow-up with primary next week    ED Course as of 06/14/25 1133   Sat Jun 14, 2025   1033 Urine culture from December 2024 in [facility] where E. coli ESBL was sensitive to Macrobid.  Clinically not suggestive of pyelonephritis.  Will treat with Macrobid while culture is pending and have patient return to the ER for worsening symptoms       Medical Decision Making  I reviewed the EMR: Prior Labs: Urine culture from 12/10/2024  Care impacted by ESBL and Diabetes  Discharge. I prescribed additional prescription strength medication(s) as charted. N/A.    MIPS (CTPE, Dental pain, Lane, Sinusitis, Asthma/COPD, Head Trauma): Not Applicable    SEPSIS: None      10:20 AM Medical student met with the student and updated me on results.  11:25 AM I met with the patient to obtain patient history and performed a physical exam. Discussed plan for ED work up including potential diagnostic studies and interventions.  11:30 AM We discussed the plan for discharge and the patient is  agreeable. Reviewed supportive cares, symptomatic treatment, outpatient follow up, and reasons to return to the Emergency Department. Patient to be discharged by ED RN.           At the conclusion of the encounter I discussed the results of all of the tests and the disposition. The questions were answered. The patient or family acknowledged understanding and was agreeable with the care plan.         Voice recognition software used for this note,  any grammatical or spelling errors are non-intentional. Please contact the author of this note directly if you are in need of any clarification.      MEDICATIONS GIVEN IN THE EMERGENCY:  Medications - No data to display    NEW PRESCRIPTIONS STARTED AT TODAY'S ER VISIT  New Prescriptions    NITROFURANTOIN MACROCRYSTAL-MONOHYDRATE (MACROBID) 100 MG CAPSULE    Take 1 capsule (100 mg) by mouth 2 times daily.          =================================================================    TRIAGE ASSESSMENT:  Pt arrives to ED with c/o UTI symptoms of dysuria, urinary frequency, urgency and urinary retention. Pt endorses it started last week (Wednesday) but then resolved and came back last night. Hx of UTIs and long term antibiotics.      Triage Assessment (Adult)       Row Name 06/14/25 0857          Triage Assessment    Airway WDL WDL        Respiratory WDL    Respiratory WDL WDL        Skin Circulation/Temperature WDL    Skin Circulation/Temperature WDL WDL        Cardiac WDL    Cardiac WDL WDL        Peripheral/Neurovascular WDL    Peripheral Neurovascular WDL WDL        Cognitive/Neuro/Behavioral WDL    Cognitive/Neuro/Behavioral WDL WDL                          HPI    Patient information was obtained from: patient    Use of : N/A        Chicho ONTIVEROS Her is a 60 year old female with a pertinent history of pre-DM and pyelonephritis who presents to this ED via walk-in for evaluation of dysuria and urinary frequency.    Patient endorses a burning pain when urinating and  increased urinary frequency. No urine color change or odor. She also states she feels more tired and fatigued since urinary symptom onset. Patient has a history of acute pyelonephritis (2024). There were no other concerns/complaints at this time.     No fever.  No back pain.  No abdominal pain.  Denies urinary tension.  Has urinary frequency.    Per chart review:  11/16/2024 - 11/21/2024: Patient presented to this ED with dysuria. UA consistent with ESBL E. coli infection, patient started on IV Rocephin, cefdinir, and zofran. WBC 12.7, lactate elevated 2.4. Patient admitted for UTI. US Renal showed several small left renal cysts; the 1 cm cyst in the left mid kidney contained a small echogenic calcified focus. No hydronephrosis. Patient started on ertapenum. She developed itching hives and shortness of breath in the hospital, improved with epinephrine, Pepcid, Benadryl, and Solu-Medrol. Patient switched to meropenem and tolerated well. Discharged on IV meropenum via midline.      REVIEW OF SYSTEMS   Review of Systems   Constitutional:  Positive for fatigue.   Genitourinary:  Positive for dysuria and frequency. Negative for hematuria.        PAST MEDICAL HISTORY:  No past medical history on file.    PAST SURGICAL HISTORY:  Past Surgical History:   Procedure Laterality Date    MIDLINE SINGLE LUMEN PLACEMENT  11/19/2024    MIDLINE SINGLE LUMEN PLACEMENT  12/16/2024           CURRENT MEDICATIONS:    acetaminophen (TYLENOL) 500 MG tablet  albuterol (PROAIR HFA/PROVENTIL HFA/VENTOLIN HFA) 108 (90 Base) MCG/ACT inhaler  Blood Glucose Monitoring Suppl (ASSURE PRO BLOOD GLUCOSE METER) JOSETTE  cyanocobalamin (CYANOCOBALAMIN) 1000 mcg/mL injection  metFORMIN (GLUCOPHAGE XR) 500 MG 24 hr tablet  nitroFURantoin macrocrystal-monohydrate (MACROBID) 100 MG capsule        ALLERGIES:  Allergies   Allergen Reactions    Ertapenem Other (See Comments), Hives and Itching     Rapid response called 11/19/24.  Pt received a partial dose of  ertapenem, then developed an all over body rash.  (Redness, itching, hives) along with hypertension and chest heaviness.    Tolerates Meropenem         FAMILY HISTORY:  Family History   Problem Relation Age of Onset    No Known Problems Mother     No Known Problems Father     No Known Problems Sister     No Known Problems Daughter     No Known Problems Maternal Grandmother     No Known Problems Maternal Grandfather     No Known Problems Paternal Grandmother     No Known Problems Paternal Grandfather     No Known Problems Maternal Aunt     No Known Problems Paternal Aunt     Hereditary Breast and Ovarian Cancer Syndrome No family hx of     Breast Cancer No family hx of     Cancer No family hx of     Colon Cancer No family hx of     Endometrial Cancer No family hx of     Ovarian Cancer No family hx of        SOCIAL HISTORY:   Social History     Socioeconomic History    Marital status:      Social Drivers of Health     Financial Resource Strain: Low Risk  (11/16/2024)    Financial Resource Strain     Within the past 12 months, have you or your family members you live with been unable to get utilities (heat, electricity) when it was really needed?: No   Food Insecurity: Food Insecurity Present (2/24/2025)    Received from [employer]    Hunger Vital Sign     Worried About Running Out of Food in the Last Year: Never true     Ran Out of Food in the Last Year: Sometimes true   Transportation Needs: No Transportation Needs (2/24/2025)    Received from [employer]    PRAPARE - Transportation     Lack of Transportation (Medical): No     Lack of Transportation (Non-Medical): No    [facility]    Social Connections   Interpersonal Safety: Low Risk  (11/16/2024)    Interpersonal Safety     Do you feel physically and emotionally safe where you currently live?: Yes     Within the past 12 months, have you been hit, slapped, kicked or otherwise physically hurt by someone?:  No     Within the past 12 months, have you been humiliated or emotionally abused in other ways by your partner or ex-partner?: No   Housing Stability: Low Risk  (2/24/2025)    Received from [employer]    Housing Stability Vital Sign     Unable to Pay for Housing in the Last Year: No     Number of Times Moved in the Last Year: 0     Homeless in the Last Year: No       VITALS:  BP (!) 168/85   Pulse 77   Temp 97.8  F (36.6  C) (Oral)   Resp 16   Wt 75.3 kg (166 lb)   SpO2 98%   BMI 33.53 kg/m      PHYSICAL EXAM      Vitals: BP (!) 168/85   Pulse 77   Temp 97.8  F (36.6  C) (Oral)   Resp 16   Wt 75.3 kg (166 lb)   SpO2 98%   BMI 33.53 kg/m    General: Appears in no acute distress, awake, alert, interactive.  Eyes: Conjunctivae non-injected. Sclera anicteric.  HENT: Atraumatic.  Neck: Supple.  Respiratory/Chest: Respiration unlabored.  Abdomen: non distended, no CVA tenderness.  No abdominal tenderness  Musculoskeletal: Normal extremities. No edema or erythema.  Skin: Normal color. No rash or diaphoresis.  Neurologic: Face symmetric, moves all extremities spontaneously. Speech clear.  Psychiatric: Oriented to person, place, and time. Affect appropriate.    LAB:  All pertinent labs reviewed and interpreted.  Results for orders placed or performed during the hospital encounter of 06/14/25   UA with Microscopic reflex to Culture    Specimen: Urine, Clean Catch   Result Value Ref Range    Color Urine Colorless Colorless, Straw, Light Yellow, Yellow    Appearance Urine Clear Clear    Glucose Urine Negative Negative mg/dL    Bilirubin Urine Negative Negative    Ketones Urine Negative Negative mg/dL    Specific Gravity Urine 1.005 1.001 - 1.030    Blood Urine 0.03 mg/dL (A) Negative    pH Urine 6.5 5.0 - 7.0    Protein Albumin Urine Negative Negative mg/dL    Urobilinogen Urine Normal Normal mg/dL    Nitrite Urine Negative Negative    Leukocyte Esterase Urine 500 Isai/uL (A) Negative    RBC Urine <1 <=2 /HPF     WBC Urine <1 <=5 /HPF       RADIOLOGY:  Reviewed all pertinent imaging. Please see official radiology report.  No orders to display             I, Richard Reyes, am serving as a scribe to document services personally performed by Home Sosa MD based on my observation and the provider's statements to me. I, Home Sosa MD, attest that Richard Reyes is acting in a scribe capacity, has observed my performance of the services and has documented them in accordance with my direction.    Home Sosa MD  [facility] EMERGENCY ROOM  [facility] [place] [address]  [phone]      Home Sosa MD  06/14/25 0897

## 2025-06-14 NOTE — ED NOTES
Discharge RN only. Pt discharged from lobby d/t high pt volumes. Physical assessment deferred to provider.

## 2025-06-15 LAB — BACTERIA UR CULT: NORMAL

## 2025-07-03 ENCOUNTER — APPOINTMENT (OUTPATIENT)
Dept: RADIOLOGY | Facility: HOSPITAL | Age: 60
End: 2025-07-03
Attending: STUDENT IN AN ORGANIZED HEALTH CARE EDUCATION/TRAINING PROGRAM
Payer: COMMERCIAL

## 2025-07-03 ENCOUNTER — HOSPITAL ENCOUNTER (EMERGENCY)
Facility: HOSPITAL | Age: 60
Discharge: HOME | End: 2025-07-03
Attending: STUDENT IN AN ORGANIZED HEALTH CARE EDUCATION/TRAINING PROGRAM | Admitting: STUDENT IN AN ORGANIZED HEALTH CARE EDUCATION/TRAINING PROGRAM
Payer: COMMERCIAL

## 2025-07-03 VITALS
HEART RATE: 66 BPM | BODY MASS INDEX: 33.51 KG/M2 | TEMPERATURE: 97.3 F | RESPIRATION RATE: 16 BRPM | WEIGHT: 166.2 LBS | DIASTOLIC BLOOD PRESSURE: 76 MMHG | HEIGHT: 59 IN | SYSTOLIC BLOOD PRESSURE: 139 MMHG | OXYGEN SATURATION: 98 %

## 2025-07-03 DIAGNOSIS — S81.811A LACERATION OF RIGHT LOWER EXTREMITY, INITIAL ENCOUNTER: ICD-10-CM

## 2025-07-03 PROCEDURE — 12002 RPR S/N/AX/GEN/TRNK2.6-7.5CM: CPT

## 2025-07-03 PROCEDURE — 73590 X-RAY EXAM OF LOWER LEG: CPT | Mod: RT

## 2025-07-03 PROCEDURE — 250N000009 HC RX 250: Performed by: STUDENT IN AN ORGANIZED HEALTH CARE EDUCATION/TRAINING PROGRAM

## 2025-07-03 PROCEDURE — 99283 EMERGENCY DEPT VISIT LOW MDM: CPT

## 2025-07-03 RX ORDER — GINSENG 100 MG
CAPSULE ORAL ONCE
Status: COMPLETED | OUTPATIENT
Start: 2025-07-03 | End: 2025-07-03

## 2025-07-03 RX ADMIN — BACITRACIN: 500 OINTMENT TOPICAL at 23:33

## 2025-07-03 ASSESSMENT — COLUMBIA-SUICIDE SEVERITY RATING SCALE - C-SSRS
1. IN THE PAST MONTH, HAVE YOU WISHED YOU WERE DEAD OR WISHED YOU COULD GO TO SLEEP AND NOT WAKE UP?: NO
6. HAVE YOU EVER DONE ANYTHING, STARTED TO DO ANYTHING, OR PREPARED TO DO ANYTHING TO END YOUR LIFE?: NO

## 2025-07-03 ASSESSMENT — ACTIVITIES OF DAILY LIVING (ADL): ADLS_ACUITY_SCORE: 56

## 2025-07-04 NOTE — DISCHARGE INSTRUCTIONS
Please do not soak your leg.  Wash daily warm water and soap.  Use topical antibacterial ointment daily.  Return for signs of infection such as redness, warmth, white discharge

## 2025-07-04 NOTE — ED TRIAGE NOTES
Pt presents with laceration to right lower leg. Pt was taking out the trash and something in the bag cut her leg. Pt has it bandaged at this time but daughter has a photo of it. Tetanus UTD.     Triage Assessment (Adult)       Row Name 07/03/25 2374          Triage Assessment    Airway WDL WDL        Respiratory WDL    Respiratory WDL WDL        Skin Circulation/Temperature WDL    Skin Circulation/Temperature WDL WDL        Cardiac WDL    Cardiac WDL WDL        Peripheral/Neurovascular WDL    Peripheral Neurovascular WDL WDL        Cognitive/Neuro/Behavioral WDL    Cognitive/Neuro/Behavioral WDL WDL

## 2025-07-04 NOTE — ED PROVIDER NOTES
Emergency Department Encounter         FINAL IMPRESSION:    Leg laceration        ED COURSE AND MEDICAL DECISION MAKING            60-year-old female here with right lateral distal leg laceration after she cut it on something sharp in a trash bag while carrying an outside    Has approximately 6 cm V-shaped laceration is quite superficial and gaping on the right mid lateral leg.  X-ray showing no foreign body.  X-ray however does suggest potential tibial plateau fracture however clinically patient has no symptoms that would suggest that.    Laceration was repaired.  Explicit discussion at bedside with daughter who is a tech, that because of the V-shaped laceration and the superficial nature of the laceration, there is a high likelihood of the wound taking quite some time to heal as well as the flap becoming devascularized and falling off.                   Medical Decision Making      Discharge. No recommendations on prescription strength medication(s). See documentation for any additional details.    MIPS (CTPE, Dental pain, Lane, Sinusitis, Asthma/COPD, Head Trauma): Not Applicable    SEPSIS: None                  10:56 PM I met with the patient to obtain patient history and performed a physical exam. Discussed plan for ED work up including potential diagnostic studies and interventions.      At the conclusion of the encounter I discussed the results of all the tests and the disposition. The questions were answered. The patient or family acknowledged understanding and was agreeable with the care plan.        MEDICATIONS GIVEN IN THE EMERGENCY DEPARTMENT:  Medications - No data to display    NEW PRESCRIPTIONS STARTED AT TODAY'S ED VISIT:  New Prescriptions    No medications on file       HPI     Patient information obtained from: patient's daughter    Use of : N/A     Chicho ONTIVEROS Her is a 60 year old female with a pertinent history of HTN, persistent asthma, DMII, sepsis, fibromyalgia, hypomagnesemia, and  "pyelonephritis, who presents to this ED by walk in for evaluation of laceration.    Today, patient was taking out the trash when she sustained a large cut to her lower right leg from the trash bag. The daughter notes that the patient's shoe was covered in blood. No other major symptoms were noted. Per [facility], patient's last tetanus was 02/24/2025. There are no other complaints/concerns at this time.      MEDICAL HISTORY     No past medical history on file.    Past Surgical History:   Procedure Laterality Date     MIDLINE SINGLE LUMEN PLACEMENT  11/19/2024     MIDLINE SINGLE LUMEN PLACEMENT  12/16/2024            acetaminophen (TYLENOL) 500 MG tablet  albuterol (PROAIR HFA/PROVENTIL HFA/VENTOLIN HFA) 108 (90 Base) MCG/ACT inhaler  Blood Glucose Monitoring Suppl (ASSURE PRO BLOOD GLUCOSE METER) JOSETTE  cyanocobalamin (CYANOCOBALAMIN) 1000 mcg/mL injection  metFORMIN (GLUCOPHAGE XR) 500 MG 24 hr tablet  nitroFURantoin macrocrystal-monohydrate (MACROBID) 100 MG capsule            PHYSICAL EXAM     /78   Pulse 80   Temp 97.3  F (36.3  C) (Temporal)   Resp 18   Ht 1.499 m (4' 11\")   Wt 75.4 kg (166 lb 3.2 oz)   SpO2 98%   BMI 33.57 kg/m        PHYSICAL EXAM:     General: Patient appears well, nontoxic, comfortable  HEENT: Moist mucous membranes,  No head trauma.    Musculoskeletal: Full range of motion of joints, no deformities appreciated.  Neurological: Alert and oriented, grossly neurologically intact.  Psychological: Normal affect and mood.  Integument:  6 cm V-shaped laceration noted to right lateral mid lower leg.  Flap is mildly dusky.          RESULTS       Labs Ordered and Resulted from Time of ED Arrival to Time of ED Departure - No data to display    XR Tibia & Fibula Right 2 Views   Final Result   IMPRESSION: Subtle irregularity of the medial aspect of tibial plateau on AP view as well as question downward sloping on lateral view raises question of a medial tibial plateau fracture. With a plateau " fracture be expected with patient's clinical    injury?. Consider obtaining oblique view of knee or CT scan if there is clinical suspicion.            PROCEDURES:  Procedures:  [facility]    -Laceration Repair    Date/Time: 7/3/2025 11:24 PM    Performed by: Gabriel Cleary DO  Authorized by: Gabriel Celary DO    Emergent condition/consent implied      ANESTHESIA (see MAR for exact dosages):     Anesthesia method:  Local infiltration    Local anesthetic:  Lidocaine 1% w/o epi  LACERATION DETAILS     Location:  Leg    Leg location:  R lower leg    Length (cm):  6    REPAIR TYPE:     Repair type:  Simple    EXPLORATION:     Hemostasis achieved with:  Cautery and direct pressure    Wound exploration: wound explored through full range of motion      Wound extent: no foreign body, no signs of injury, no underlying fracture and no vascular damage      TREATMENT:     Area cleansed with:  Saline    Amount of cleaning:  Standard    SKIN REPAIR     Repair method:  Sutures    Suture size:  4-0    Suture material:  Nylon    Suture technique:  Simple interrupted    Number of sutures:  4    APPROXIMATION     Approximation:  Close    POST-PROCEDURE DETAILS     Dressing:  Open (no dressing)         I, Megan Mercado am serving as a scribe to document services personally performed by Gabriel Cleary DO, based on my observations and the provider's statements to me.  I, Gabriel Cleary DO, attest that Megan Mercado is acting in a scribe capacity, has observed my performance of the services and has documented them in accordance with my direction.    Gabriel Cleary DO  Emergency Medicine  [facility] EMERGENCY DEPARTMENT       Gabriel Cleary DO  07/04/25 0050